# Patient Record
Sex: FEMALE | Race: WHITE | NOT HISPANIC OR LATINO | Employment: FULL TIME | URBAN - METROPOLITAN AREA
[De-identification: names, ages, dates, MRNs, and addresses within clinical notes are randomized per-mention and may not be internally consistent; named-entity substitution may affect disease eponyms.]

---

## 2017-12-22 ENCOUNTER — HOSPITAL ENCOUNTER (EMERGENCY)
Facility: HOSPITAL | Age: 27
Discharge: HOME | End: 2017-12-22
Attending: EMERGENCY MEDICINE | Admitting: EMERGENCY MEDICINE

## 2017-12-22 ENCOUNTER — APPOINTMENT (OUTPATIENT)
Dept: CT IMAGING | Facility: HOSPITAL | Age: 27
End: 2017-12-22

## 2017-12-22 VITALS
HEART RATE: 77 BPM | SYSTOLIC BLOOD PRESSURE: 128 MMHG | OXYGEN SATURATION: 100 % | WEIGHT: 130 LBS | RESPIRATION RATE: 18 BRPM | DIASTOLIC BLOOD PRESSURE: 79 MMHG | HEIGHT: 69 IN | BODY MASS INDEX: 19.26 KG/M2 | TEMPERATURE: 97.9 F

## 2017-12-22 DIAGNOSIS — N20.1 RIGHT URETERAL STONE: ICD-10-CM

## 2017-12-22 DIAGNOSIS — R11.2 NON-INTRACTABLE VOMITING WITH NAUSEA, UNSPECIFIED VOMITING TYPE: ICD-10-CM

## 2017-12-22 DIAGNOSIS — R10.9 RIGHT FLANK PAIN: Primary | ICD-10-CM

## 2017-12-22 LAB
ALBUMIN SERPL-MCNC: 4.2 G/DL (ref 3.2–4.8)
ALBUMIN/GLOB SERPL: 1.3 G/DL (ref 1.5–2.5)
ALP SERPL-CCNC: 52 U/L (ref 25–100)
ALT SERPL W P-5'-P-CCNC: 15 U/L (ref 7–40)
ANION GAP SERPL CALCULATED.3IONS-SCNC: 8 MMOL/L (ref 3–11)
AST SERPL-CCNC: 21 U/L (ref 0–33)
B-HCG UR QL: NEGATIVE
BACTERIA UR QL AUTO: ABNORMAL /HPF
BASOPHILS # BLD AUTO: 0.03 10*3/MM3 (ref 0–0.2)
BASOPHILS NFR BLD AUTO: 0.2 % (ref 0–1)
BILIRUB SERPL-MCNC: 0.4 MG/DL (ref 0.3–1.2)
BILIRUB UR QL STRIP: NEGATIVE
BUN BLD-MCNC: 12 MG/DL (ref 9–23)
BUN/CREAT SERPL: 10.9 (ref 7–25)
CALCIUM SPEC-SCNC: 9.2 MG/DL (ref 8.7–10.4)
CHLORIDE SERPL-SCNC: 109 MMOL/L (ref 99–109)
CLARITY UR: CLEAR
CO2 SERPL-SCNC: 22 MMOL/L (ref 20–31)
COLOR UR: YELLOW
CREAT BLD-MCNC: 1.1 MG/DL (ref 0.6–1.3)
DEPRECATED RDW RBC AUTO: 35.7 FL (ref 37–54)
EOSINOPHIL # BLD AUTO: 0.03 10*3/MM3 (ref 0–0.3)
EOSINOPHIL NFR BLD AUTO: 0.2 % (ref 0–3)
ERYTHROCYTE [DISTWIDTH] IN BLOOD BY AUTOMATED COUNT: 12.5 % (ref 11.3–14.5)
GFR SERPL CREATININE-BSD FRML MDRD: 61 ML/MIN/1.73
GLOBULIN UR ELPH-MCNC: 3.3 GM/DL
GLUCOSE BLD-MCNC: 107 MG/DL (ref 70–100)
GLUCOSE UR STRIP-MCNC: NEGATIVE MG/DL
HCT VFR BLD AUTO: 37.8 % (ref 34.5–44)
HGB BLD-MCNC: 12.6 G/DL (ref 11.5–15.5)
HGB UR QL STRIP.AUTO: NEGATIVE
HYALINE CASTS UR QL AUTO: ABNORMAL /LPF
IMM GRANULOCYTES # BLD: 0.04 10*3/MM3 (ref 0–0.03)
IMM GRANULOCYTES NFR BLD: 0.3 % (ref 0–0.6)
INTERNAL NEGATIVE CONTROL: NEGATIVE
INTERNAL POSITIVE CONTROL: POSITIVE
KETONES UR QL STRIP: ABNORMAL
LEUKOCYTE ESTERASE UR QL STRIP.AUTO: NEGATIVE
LIPASE SERPL-CCNC: 31 U/L (ref 6–51)
LYMPHOCYTES # BLD AUTO: 2 10*3/MM3 (ref 0.6–4.8)
LYMPHOCYTES NFR BLD AUTO: 14.4 % (ref 24–44)
Lab: NORMAL
MCH RBC QN AUTO: 26.1 PG (ref 27–31)
MCHC RBC AUTO-ENTMCNC: 33.3 G/DL (ref 32–36)
MCV RBC AUTO: 78.4 FL (ref 80–99)
MONOCYTES # BLD AUTO: 0.82 10*3/MM3 (ref 0–1)
MONOCYTES NFR BLD AUTO: 5.9 % (ref 0–12)
MUCOUS THREADS URNS QL MICRO: ABNORMAL /HPF
NEUTROPHILS # BLD AUTO: 10.99 10*3/MM3 (ref 1.5–8.3)
NEUTROPHILS NFR BLD AUTO: 79 % (ref 41–71)
NITRITE UR QL STRIP: NEGATIVE
PH UR STRIP.AUTO: 6 [PH] (ref 5–8)
PLATELET # BLD AUTO: 211 10*3/MM3 (ref 150–450)
PMV BLD AUTO: 10.1 FL (ref 6–12)
POTASSIUM BLD-SCNC: 3.9 MMOL/L (ref 3.5–5.5)
PROT SERPL-MCNC: 7.5 G/DL (ref 5.7–8.2)
PROT UR QL STRIP: ABNORMAL
RBC # BLD AUTO: 4.82 10*6/MM3 (ref 3.89–5.14)
RBC # UR: ABNORMAL /HPF
REF LAB TEST METHOD: ABNORMAL
SODIUM BLD-SCNC: 139 MMOL/L (ref 132–146)
SP GR UR STRIP: >=1.03 (ref 1–1.03)
SQUAMOUS #/AREA URNS HPF: ABNORMAL /HPF
UROBILINOGEN UR QL STRIP: ABNORMAL
WBC NRBC COR # BLD: 13.91 10*3/MM3 (ref 3.5–10.8)
WBC UR QL AUTO: ABNORMAL /HPF

## 2017-12-22 PROCEDURE — 25010000002 ONDANSETRON PER 1 MG: Performed by: EMERGENCY MEDICINE

## 2017-12-22 PROCEDURE — 96361 HYDRATE IV INFUSION ADD-ON: CPT

## 2017-12-22 PROCEDURE — 85025 COMPLETE CBC W/AUTO DIFF WBC: CPT | Performed by: PHYSICIAN ASSISTANT

## 2017-12-22 PROCEDURE — 74176 CT ABD & PELVIS W/O CONTRAST: CPT

## 2017-12-22 PROCEDURE — 25010000002 ONDANSETRON PER 1 MG: Performed by: PHYSICIAN ASSISTANT

## 2017-12-22 PROCEDURE — 81001 URINALYSIS AUTO W/SCOPE: CPT | Performed by: PHYSICIAN ASSISTANT

## 2017-12-22 PROCEDURE — 87086 URINE CULTURE/COLONY COUNT: CPT | Performed by: PHYSICIAN ASSISTANT

## 2017-12-22 PROCEDURE — 99283 EMERGENCY DEPT VISIT LOW MDM: CPT

## 2017-12-22 PROCEDURE — 83690 ASSAY OF LIPASE: CPT | Performed by: PHYSICIAN ASSISTANT

## 2017-12-22 PROCEDURE — 25010000002 HYDROMORPHONE PER 4 MG: Performed by: EMERGENCY MEDICINE

## 2017-12-22 PROCEDURE — 96374 THER/PROPH/DIAG INJ IV PUSH: CPT

## 2017-12-22 PROCEDURE — 96375 TX/PRO/DX INJ NEW DRUG ADDON: CPT

## 2017-12-22 PROCEDURE — 80053 COMPREHEN METABOLIC PANEL: CPT | Performed by: PHYSICIAN ASSISTANT

## 2017-12-22 PROCEDURE — 96376 TX/PRO/DX INJ SAME DRUG ADON: CPT

## 2017-12-22 PROCEDURE — 25010000002 KETOROLAC TROMETHAMINE PER 15 MG: Performed by: PHYSICIAN ASSISTANT

## 2017-12-22 RX ORDER — ONDANSETRON 2 MG/ML
4 INJECTION INTRAMUSCULAR; INTRAVENOUS ONCE
Status: COMPLETED | OUTPATIENT
Start: 2017-12-22 | End: 2017-12-22

## 2017-12-22 RX ORDER — HYDROMORPHONE HYDROCHLORIDE 1 MG/ML
0.25 INJECTION, SOLUTION INTRAMUSCULAR; INTRAVENOUS; SUBCUTANEOUS ONCE
Status: COMPLETED | OUTPATIENT
Start: 2017-12-22 | End: 2017-12-22

## 2017-12-22 RX ORDER — TAMSULOSIN HYDROCHLORIDE 0.4 MG/1
1 CAPSULE ORAL DAILY
Qty: 30 CAPSULE | Refills: 0 | Status: SHIPPED | OUTPATIENT
Start: 2017-12-22 | End: 2021-02-22

## 2017-12-22 RX ORDER — ONDANSETRON 4 MG/1
4 TABLET, ORALLY DISINTEGRATING ORAL EVERY 6 HOURS PRN
Qty: 15 TABLET | Refills: 0 | Status: SHIPPED | OUTPATIENT
Start: 2017-12-22 | End: 2021-02-22

## 2017-12-22 RX ORDER — DROSPIRENONE AND ETHINYL ESTRADIOL 0.02-3(28)
1 KIT ORAL DAILY
COMMUNITY
End: 2021-02-22

## 2017-12-22 RX ORDER — CEFDINIR 300 MG/1
300 CAPSULE ORAL 2 TIMES DAILY
Qty: 20 CAPSULE | Refills: 0 | Status: SHIPPED | OUTPATIENT
Start: 2017-12-22 | End: 2018-01-01

## 2017-12-22 RX ORDER — HYDROCODONE BITARTRATE AND ACETAMINOPHEN 7.5; 325 MG/1; MG/1
1 TABLET ORAL EVERY 6 HOURS PRN
Qty: 15 TABLET | Refills: 0 | Status: SHIPPED | OUTPATIENT
Start: 2017-12-22 | End: 2021-02-22

## 2017-12-22 RX ORDER — HYDROMORPHONE HYDROCHLORIDE 1 MG/ML
0.5 INJECTION, SOLUTION INTRAMUSCULAR; INTRAVENOUS; SUBCUTANEOUS ONCE
Status: DISCONTINUED | OUTPATIENT
Start: 2017-12-22 | End: 2017-12-22

## 2017-12-22 RX ORDER — SODIUM CHLORIDE 0.9 % (FLUSH) 0.9 %
10 SYRINGE (ML) INJECTION AS NEEDED
Status: DISCONTINUED | OUTPATIENT
Start: 2017-12-22 | End: 2017-12-22 | Stop reason: HOSPADM

## 2017-12-22 RX ORDER — KETOROLAC TROMETHAMINE 15 MG/ML
15 INJECTION, SOLUTION INTRAMUSCULAR; INTRAVENOUS ONCE
Status: COMPLETED | OUTPATIENT
Start: 2017-12-22 | End: 2017-12-22

## 2017-12-22 RX ADMIN — SODIUM CHLORIDE 1000 ML: 9 INJECTION, SOLUTION INTRAVENOUS at 11:42

## 2017-12-22 RX ADMIN — ONDANSETRON 4 MG: 2 INJECTION INTRAMUSCULAR; INTRAVENOUS at 11:43

## 2017-12-22 RX ADMIN — HYDROMORPHONE HYDROCHLORIDE 0.25 MG: 1 INJECTION, SOLUTION INTRAMUSCULAR; INTRAVENOUS; SUBCUTANEOUS at 14:04

## 2017-12-22 RX ADMIN — KETOROLAC TROMETHAMINE 15 MG: 15 INJECTION, SOLUTION INTRAMUSCULAR; INTRAVENOUS at 11:44

## 2017-12-22 RX ADMIN — HYDROMORPHONE HYDROCHLORIDE 0.25 MG: 1 INJECTION, SOLUTION INTRAMUSCULAR; INTRAVENOUS; SUBCUTANEOUS at 12:53

## 2017-12-22 RX ADMIN — ONDANSETRON 4 MG: 2 INJECTION INTRAMUSCULAR; INTRAVENOUS at 14:01

## 2017-12-22 NOTE — ED PROVIDER NOTES
Subjective   HPI Comments: Patient is a 28 yo female presenting to ED with right flank pain. Patient describes the pain as sharp and stabbing that began around 0830 this morning. The pain has been constant but with episodes of worse pain that caused her to vomit. She denies urinary sx. She denies fevers. She has prior hx of kidney stones. No prior abdominal surgery.     Patient is a 27 y.o. female presenting with flank pain.   History provided by:  Patient  Flank Pain   Pain location:  R flank  Pain quality: sharp    Pain radiates to:  RLQ  Onset quality:  Sudden  Duration:  4 hours  Timing:  Constant  Progression:  Waxing and waning  Relieved by: Partially improved with Motrin, Lortab.  Associated symptoms: chills, nausea and vomiting    Associated symptoms: no chest pain, no constipation, no cough, no diarrhea, no dysuria, no fever, no hematuria, no shortness of breath, no sore throat, no vaginal bleeding and no vaginal discharge        Review of Systems   Constitutional: Positive for chills. Negative for fever.   HENT: Negative for congestion, ear pain, sore throat and trouble swallowing.    Eyes: Negative for pain, redness and visual disturbance.   Respiratory: Negative for cough, chest tightness and shortness of breath.    Cardiovascular: Negative for chest pain and leg swelling.   Gastrointestinal: Positive for nausea and vomiting. Negative for abdominal pain, constipation and diarrhea.   Genitourinary: Positive for flank pain. Negative for difficulty urinating, dysuria, hematuria, vaginal bleeding and vaginal discharge.   Musculoskeletal: Negative for arthralgias, back pain and joint swelling.   Skin: Negative for rash and wound.   Neurological: Negative for dizziness, syncope, speech difficulty, weakness, numbness and headaches.   Psychiatric/Behavioral: Negative for confusion.   All other systems reviewed and are negative.      History reviewed. No pertinent past medical history.    Allergies   Allergen  Reactions   • Amoxicillin    • Lidocaine        Past Surgical History:   Procedure Laterality Date   • TOOTH EXTRACTION         History reviewed. No pertinent family history.    Social History     Social History   • Marital status: Single     Spouse name: N/A   • Number of children: N/A   • Years of education: N/A     Social History Main Topics   • Smoking status: Never Smoker   • Smokeless tobacco: None   • Alcohol use Yes      Comment: OCCASIONAL   • Drug use: No   • Sexual activity: Not Asked     Other Topics Concern   • None     Social History Narrative   • None           Objective   Physical Exam   Constitutional: She is oriented to person, place, and time. Vital signs are normal. She appears well-developed.   HENT:   Head: Atraumatic.   Nose: Nose normal.   Mouth/Throat: Mucous membranes are normal.   Eyes: Conjunctivae, EOM and lids are normal. Pupils are equal, round, and reactive to light.   Neck: Normal range of motion. Neck supple.   Cardiovascular: Normal rate, regular rhythm and normal heart sounds.    Pulmonary/Chest: Effort normal and breath sounds normal. She has no wheezes.   Abdominal: Soft. She exhibits no distension. There is no tenderness. There is CVA tenderness (Right). There is no rebound and no guarding.   Musculoskeletal: Normal range of motion. She exhibits no edema or tenderness.   Neurological: She is alert and oriented to person, place, and time. No sensory deficit.   Skin: Skin is warm and dry. No rash noted. No erythema.   Psychiatric: She has a normal mood and affect. Her speech is normal and behavior is normal.   Nursing note and vitals reviewed.      Procedures         ED Course  ED Course      Re-examined patient several times in ED. Pt feeling better after treatment. Discussed results and treatment plan. Will send home with Lortab, Zofran, Flomax, and Omncief.     JESSICA query complete. Treatment plan to include limited course of prescribed  controlled substance. Risks including  addiction, benefits, and alternatives presented to patient.       Recent Results (from the past 24 hour(s))   Comprehensive Metabolic Panel    Collection Time: 12/22/17 11:24 AM   Result Value Ref Range    Glucose 107 (H) 70 - 100 mg/dL    BUN 12 9 - 23 mg/dL    Creatinine 1.10 0.60 - 1.30 mg/dL    Sodium 139 132 - 146 mmol/L    Potassium 3.9 3.5 - 5.5 mmol/L    Chloride 109 99 - 109 mmol/L    CO2 22.0 20.0 - 31.0 mmol/L    Calcium 9.2 8.7 - 10.4 mg/dL    Total Protein 7.5 5.7 - 8.2 g/dL    Albumin 4.20 3.20 - 4.80 g/dL    ALT (SGPT) 15 7 - 40 U/L    AST (SGOT) 21 0 - 33 U/L    Alkaline Phosphatase 52 25 - 100 U/L    Total Bilirubin 0.4 0.3 - 1.2 mg/dL    eGFR Non African Amer 61 >60 mL/min/1.73    Globulin 3.3 gm/dL    A/G Ratio 1.3 (L) 1.5 - 2.5 g/dL    BUN/Creatinine Ratio 10.9 7.0 - 25.0    Anion Gap 8.0 3.0 - 11.0 mmol/L   Lipase    Collection Time: 12/22/17 11:24 AM   Result Value Ref Range    Lipase 31 6 - 51 U/L   Urinalysis With / Culture If Indicated - Urine, Clean Catch    Collection Time: 12/22/17 11:24 AM   Result Value Ref Range    Color, UA Yellow Yellow, Straw    Appearance, UA Clear Clear    pH, UA 6.0 5.0 - 8.0    Specific Gravity, UA >=1.030 1.001 - 1.030    Glucose, UA Negative Negative    Ketones, UA Trace (A) Negative    Bilirubin, UA Negative Negative    Blood, UA Negative Negative    Protein, UA 30 mg/dL (1+) (A) Negative    Leuk Esterase, UA Negative Negative    Nitrite, UA Negative Negative    Urobilinogen, UA 0.2 E.U./dL 0.2 - 1.0 E.U./dL   CBC Auto Differential    Collection Time: 12/22/17 11:24 AM   Result Value Ref Range    WBC 13.91 (H) 3.50 - 10.80 10*3/mm3    RBC 4.82 3.89 - 5.14 10*6/mm3    Hemoglobin 12.6 11.5 - 15.5 g/dL    Hematocrit 37.8 34.5 - 44.0 %    MCV 78.4 (L) 80.0 - 99.0 fL    MCH 26.1 (L) 27.0 - 31.0 pg    MCHC 33.3 32.0 - 36.0 g/dL    RDW 12.5 11.3 - 14.5 %    RDW-SD 35.7 (L) 37.0 - 54.0 fl    MPV 10.1 6.0 - 12.0 fL    Platelets 211 150 - 450 10*3/mm3    Neutrophil %  79.0 (H) 41.0 - 71.0 %    Lymphocyte % 14.4 (L) 24.0 - 44.0 %    Monocyte % 5.9 0.0 - 12.0 %    Eosinophil % 0.2 0.0 - 3.0 %    Basophil % 0.2 0.0 - 1.0 %    Immature Grans % 0.3 0.0 - 0.6 %    Neutrophils, Absolute 10.99 (H) 1.50 - 8.30 10*3/mm3    Lymphocytes, Absolute 2.00 0.60 - 4.80 10*3/mm3    Monocytes, Absolute 0.82 0.00 - 1.00 10*3/mm3    Eosinophils, Absolute 0.03 0.00 - 0.30 10*3/mm3    Basophils, Absolute 0.03 0.00 - 0.20 10*3/mm3    Immature Grans, Absolute 0.04 (H) 0.00 - 0.03 10*3/mm3   Urinalysis, Microscopic Only - Urine, Clean Catch    Collection Time: 12/22/17 11:24 AM   Result Value Ref Range    RBC, UA 0-2 None Seen, 0-2 /HPF    WBC, UA 6-12 (A) None Seen /HPF    Bacteria, UA 1+ (A) None Seen, Trace /HPF    Squamous Epithelial Cells, UA 7-12 (A) None Seen, 0-2 /HPF    Hyaline Casts, UA 7-12 0 - 6 /LPF    Mucus, UA Large/3+ (A) None Seen, Trace /HPF    Methodology Manual Light Microscopy    POCT Pregnancy, Urine    Collection Time: 12/22/17 11:36 AM   Result Value Ref Range    HCG, Urine, QL Negative Negative    Lot Number [id number]     Internal Positive Control Positive     Internal Negative Control Negative      Note: In addition to lab results from this visit, the labs listed above may include labs taken at another facility or during a different encounter within the last 24 hours. Please correlate lab times with ED admission and discharge times for further clarification of the services performed during this visit.    CT Abdomen Pelvis Without Contrast   Preliminary Result   1. A 1 mm calculus in the pelvis, near the expected location of the   right ureter. Mild proximal ureteral dilatation but no evidence of   distal ureteral or renal collecting system dilatation. This is equivocal   for a minimally or nonobstructing stone. Please correlate with patient's   clinical findings.   2. No evidence of acute disease elsewhere.       D:  12/22/2017   E:  12/22/2017            Vitals:    12/22/17 1119  "12/22/17 1120 12/22/17 1121 12/22/17 1130   BP:  137/87 137/87 128/79   BP Location:   Right arm    Patient Position:   Lying    Pulse:   76 77   Resp:   18 18   Temp:   97.9 °F (36.6 °C)    TempSrc:   Oral    SpO2:   100% 100%   Weight: 59 kg (130 lb)      Height: 175.3 cm (69\")        Medications   ketorolac (TORADOL) injection 15 mg (15 mg Intravenous Given 12/22/17 1144)   ondansetron (ZOFRAN) injection 4 mg (4 mg Intravenous Given 12/22/17 1143)   sodium chloride 0.9 % bolus 1,000 mL (0 mL Intravenous Stopped 12/22/17 1242)   HYDROmorphone (DILAUDID) injection 0.25 mg (0.25 mg Intravenous Given 12/22/17 1253)   HYDROmorphone (DILAUDID) injection 0.25 mg (0.25 mg Intravenous Given 12/22/17 1404)   ondansetron (ZOFRAN) injection 4 mg (4 mg Intravenous Given 12/22/17 1401)                           MDM    Final diagnoses:   Right flank pain   Right ureteral stone   Non-intractable vomiting with nausea, unspecified vomiting type            BRYANT Obrien  12/22/17 2012       BRYANT Obrien  12/22/17 2013    "

## 2017-12-24 LAB
BACTERIA SPEC AEROBE CULT: NORMAL
BACTERIA SPEC AEROBE CULT: NORMAL

## 2018-01-07 ENCOUNTER — HOSPITAL ENCOUNTER (EMERGENCY)
Facility: HOSPITAL | Age: 28
Discharge: HOME | End: 2018-01-07
Attending: EMERGENCY MEDICINE | Admitting: EMERGENCY MEDICINE

## 2018-01-07 VITALS
RESPIRATION RATE: 16 BRPM | WEIGHT: 130 LBS | TEMPERATURE: 98.2 F | OXYGEN SATURATION: 100 % | BODY MASS INDEX: 19.26 KG/M2 | HEART RATE: 89 BPM | HEIGHT: 69 IN | DIASTOLIC BLOOD PRESSURE: 80 MMHG | SYSTOLIC BLOOD PRESSURE: 126 MMHG

## 2018-01-07 DIAGNOSIS — A04.72 CLOSTRIDIUM DIFFICILE COLITIS: Primary | ICD-10-CM

## 2018-01-07 LAB
ALBUMIN SERPL-MCNC: 4.2 G/DL (ref 3.2–4.8)
ALBUMIN/GLOB SERPL: 1.3 G/DL (ref 1.5–2.5)
ALP SERPL-CCNC: 72 U/L (ref 25–100)
ALT SERPL W P-5'-P-CCNC: 13 U/L (ref 7–40)
ANION GAP SERPL CALCULATED.3IONS-SCNC: 12 MMOL/L (ref 3–11)
AST SERPL-CCNC: 18 U/L (ref 0–33)
B-HCG UR QL: NEGATIVE
BACTERIA UR QL AUTO: ABNORMAL /HPF
BASOPHILS # BLD AUTO: 0.03 10*3/MM3 (ref 0–0.2)
BASOPHILS NFR BLD AUTO: 0.2 % (ref 0–1)
BILIRUB SERPL-MCNC: 0.6 MG/DL (ref 0.3–1.2)
BILIRUB UR QL STRIP: ABNORMAL
BUN BLD-MCNC: 8 MG/DL (ref 9–23)
BUN/CREAT SERPL: 8.9 (ref 7–25)
C DIFF TOX GENS STL QL NAA+PROBE: DETECTED
CALCIUM SPEC-SCNC: 8.9 MG/DL (ref 8.7–10.4)
CHLORIDE SERPL-SCNC: 100 MMOL/L (ref 99–109)
CLARITY UR: ABNORMAL
CO2 SERPL-SCNC: 21 MMOL/L (ref 20–31)
COLOR UR: ABNORMAL
CREAT BLD-MCNC: 0.9 MG/DL (ref 0.6–1.3)
DEPRECATED RDW RBC AUTO: 33.8 FL (ref 37–54)
EOSINOPHIL # BLD AUTO: 0.05 10*3/MM3 (ref 0–0.3)
EOSINOPHIL NFR BLD AUTO: 0.4 % (ref 0–3)
ERYTHROCYTE [DISTWIDTH] IN BLOOD BY AUTOMATED COUNT: 12.3 % (ref 11.3–14.5)
GFR SERPL CREATININE-BSD FRML MDRD: 76 ML/MIN/1.73
GLOBULIN UR ELPH-MCNC: 3.2 GM/DL
GLUCOSE BLD-MCNC: 74 MG/DL (ref 70–100)
GLUCOSE UR STRIP-MCNC: NEGATIVE MG/DL
HCT VFR BLD AUTO: 40.2 % (ref 34.5–44)
HGB BLD-MCNC: 13.8 G/DL (ref 11.5–15.5)
HGB UR QL STRIP.AUTO: NEGATIVE
HOLD SPECIMEN: NORMAL
HOLD SPECIMEN: NORMAL
HYALINE CASTS UR QL AUTO: ABNORMAL /LPF
IMM GRANULOCYTES # BLD: 0.03 10*3/MM3 (ref 0–0.03)
IMM GRANULOCYTES NFR BLD: 0.2 % (ref 0–0.6)
INTERNAL NEGATIVE CONTROL: NORMAL
INTERNAL POSITIVE CONTROL: NORMAL
KETONES UR QL STRIP: ABNORMAL
LEUKOCYTE ESTERASE UR QL STRIP.AUTO: NEGATIVE
LIPASE SERPL-CCNC: 28 U/L (ref 6–51)
LYMPHOCYTES # BLD AUTO: 1.54 10*3/MM3 (ref 0.6–4.8)
LYMPHOCYTES NFR BLD AUTO: 11.3 % (ref 24–44)
Lab: NORMAL
MCH RBC QN AUTO: 26.3 PG (ref 27–31)
MCHC RBC AUTO-ENTMCNC: 34.3 G/DL (ref 32–36)
MCV RBC AUTO: 76.7 FL (ref 80–99)
MONOCYTES # BLD AUTO: 1.03 10*3/MM3 (ref 0–1)
MONOCYTES NFR BLD AUTO: 7.6 % (ref 0–12)
MUCOUS THREADS URNS QL MICRO: ABNORMAL /HPF
NEUTROPHILS # BLD AUTO: 10.9 10*3/MM3 (ref 1.5–8.3)
NEUTROPHILS NFR BLD AUTO: 80.3 % (ref 41–71)
NITRITE UR QL STRIP: NEGATIVE
PH UR STRIP.AUTO: 5.5 [PH] (ref 5–8)
PLATELET # BLD AUTO: 218 10*3/MM3 (ref 150–450)
PMV BLD AUTO: 10 FL (ref 6–12)
POTASSIUM BLD-SCNC: 3.4 MMOL/L (ref 3.5–5.5)
PROT SERPL-MCNC: 7.4 G/DL (ref 5.7–8.2)
PROT UR QL STRIP: NEGATIVE
RBC # BLD AUTO: 5.24 10*6/MM3 (ref 3.89–5.14)
RBC # UR: ABNORMAL /HPF
REF LAB TEST METHOD: ABNORMAL
SODIUM BLD-SCNC: 133 MMOL/L (ref 132–146)
SP GR UR STRIP: 1.02 (ref 1–1.03)
SQUAMOUS #/AREA URNS HPF: ABNORMAL /HPF
UROBILINOGEN UR QL STRIP: ABNORMAL
WBC NRBC COR # BLD: 13.58 10*3/MM3 (ref 3.5–10.8)
WBC UR QL AUTO: ABNORMAL /HPF
WHOLE BLOOD HOLD SPECIMEN: NORMAL
WHOLE BLOOD HOLD SPECIMEN: NORMAL

## 2018-01-07 PROCEDURE — 85025 COMPLETE CBC W/AUTO DIFF WBC: CPT | Performed by: EMERGENCY MEDICINE

## 2018-01-07 PROCEDURE — 96375 TX/PRO/DX INJ NEW DRUG ADDON: CPT

## 2018-01-07 PROCEDURE — 87046 STOOL CULTR AEROBIC BACT EA: CPT | Performed by: PHYSICIAN ASSISTANT

## 2018-01-07 PROCEDURE — 80053 COMPREHEN METABOLIC PANEL: CPT | Performed by: EMERGENCY MEDICINE

## 2018-01-07 PROCEDURE — 83690 ASSAY OF LIPASE: CPT | Performed by: EMERGENCY MEDICINE

## 2018-01-07 PROCEDURE — 96374 THER/PROPH/DIAG INJ IV PUSH: CPT

## 2018-01-07 PROCEDURE — 25010000002 PROMETHAZINE PER 50 MG: Performed by: PHYSICIAN ASSISTANT

## 2018-01-07 PROCEDURE — 99284 EMERGENCY DEPT VISIT MOD MDM: CPT

## 2018-01-07 PROCEDURE — 81001 URINALYSIS AUTO W/SCOPE: CPT | Performed by: EMERGENCY MEDICINE

## 2018-01-07 PROCEDURE — 87493 C DIFF AMPLIFIED PROBE: CPT | Performed by: PHYSICIAN ASSISTANT

## 2018-01-07 PROCEDURE — 87086 URINE CULTURE/COLONY COUNT: CPT | Performed by: EMERGENCY MEDICINE

## 2018-01-07 PROCEDURE — 87045 FECES CULTURE AEROBIC BACT: CPT | Performed by: PHYSICIAN ASSISTANT

## 2018-01-07 RX ORDER — SODIUM CHLORIDE 0.9 % (FLUSH) 0.9 %
10 SYRINGE (ML) INJECTION AS NEEDED
Status: DISCONTINUED | OUTPATIENT
Start: 2018-01-07 | End: 2018-01-07 | Stop reason: HOSPADM

## 2018-01-07 RX ORDER — CHOLESTYRAMINE LIGHT 4 G/5.7G
4 POWDER, FOR SUSPENSION ORAL 3 TIMES DAILY
Qty: 42 PACKET | Refills: 0 | Status: SHIPPED | OUTPATIENT
Start: 2018-01-07 | End: 2021-02-22

## 2018-01-07 RX ORDER — FAMOTIDINE 10 MG/ML
20 INJECTION, SOLUTION INTRAVENOUS ONCE
Status: COMPLETED | OUTPATIENT
Start: 2018-01-07 | End: 2018-01-07

## 2018-01-07 RX ORDER — PROMETHAZINE HYDROCHLORIDE 25 MG/ML
12.5 INJECTION, SOLUTION INTRAMUSCULAR; INTRAVENOUS ONCE
Status: COMPLETED | OUTPATIENT
Start: 2018-01-07 | End: 2018-01-07

## 2018-01-07 RX ADMIN — FAMOTIDINE 20 MG: 10 INJECTION INTRAVENOUS at 10:40

## 2018-01-07 RX ADMIN — VANCOMYCIN 125 MG: KIT at 15:43

## 2018-01-07 RX ADMIN — SODIUM CHLORIDE 1000 ML: 9 INJECTION, SOLUTION INTRAVENOUS at 10:35

## 2018-01-07 RX ADMIN — PROMETHAZINE HYDROCHLORIDE 12.5 MG: 25 INJECTION INTRAMUSCULAR; INTRAVENOUS at 10:38

## 2018-01-07 NOTE — ED PROVIDER NOTES
Subjective   HPI Comments: 27-year-old female presents to the emergency department with complaints of diarrhea as well as some nausea and vomiting.  The patient states that she started with diarrhea about 4 days ago.  She took some over-the-counter Imodium with minimal relief.  Her symptoms got worse yesterday and today.  She has had several episodes of vomiting.  She has had some crampy abdominal pain associated with the diarrhea.  She notes a small amount of bright red blood secondary to hemorrhoids that have flared up as result of the diarrhea.  The patient thinks that she may have eaten some bad chicken while she was in [place] last week.  The patient notes that she was on antibiotics about 2-1/2 weeks ago for an infected kidney stone.  She finished the antibiotics last week.  No known health issues.  No prior surgeries.  Her PCP is Dr. Juarez.  She is a nonsmoker.  No alcohol or drug use.  Her last menstrual period was one week ago.      History provided by:  Patient      Review of Systems   Constitutional: Positive for appetite change (decreased) and fatigue. Negative for chills.   HENT: Negative for congestion, ear pain, nosebleeds, rhinorrhea and sore throat.    Eyes: Negative for pain, discharge and visual disturbance.   Respiratory: Negative for cough, shortness of breath and wheezing.    Cardiovascular: Negative for chest pain, palpitations and leg swelling.   Gastrointestinal: Positive for abdominal pain, blood in stool, diarrhea, nausea and vomiting.   Endocrine: Negative.    Genitourinary: Negative for dysuria, hematuria and urgency.   Musculoskeletal: Negative for arthralgias and back pain.   Skin: Negative for pallor and rash.   Allergic/Immunologic: Negative for immunocompromised state.   Neurological: Positive for weakness (generalized). Negative for dizziness, speech difficulty and headaches.   Hematological: Negative for adenopathy. Does not bruise/bleed easily.   Psychiatric/Behavioral:  Negative.        History reviewed. No pertinent past medical history.    Allergies   Allergen Reactions   • Amoxicillin    • Lidocaine        Past Surgical History:   Procedure Laterality Date   • TOOTH EXTRACTION         History reviewed. No pertinent family history.    Social History     Social History   • Marital status: Single     Spouse name: N/A   • Number of children: N/A   • Years of education: N/A     Social History Main Topics   • Smoking status: Never Smoker   • Smokeless tobacco: None   • Alcohol use Yes      Comment: OCCASIONAL   • Drug use: No   • Sexual activity: Not Asked     Other Topics Concern   • None     Social History Narrative           Objective   Physical Exam   Constitutional: She is oriented to person, place, and time. She appears well-developed and well-nourished. No distress.   HENT:   Head: Normocephalic and atraumatic.   Nose: Nose normal.   Mouth/Throat: Oropharynx is clear and moist.   Eyes: Conjunctivae and EOM are normal. Pupils are equal, round, and reactive to light. No scleral icterus.   Neck: Normal range of motion. Neck supple.   Cardiovascular: Normal rate, regular rhythm, normal heart sounds and intact distal pulses.    No murmur heard.  Pulmonary/Chest: Effort normal and breath sounds normal. No respiratory distress. She has no wheezes. She has no rales. She exhibits no tenderness.   Abdominal: Soft. Bowel sounds are normal. There is tenderness (mild diffuse tenderness). There is no rebound and no guarding.   Musculoskeletal: Normal range of motion. She exhibits no edema or tenderness.   Neurological: She is alert and oriented to person, place, and time.   Skin: Skin is warm and dry. No rash noted. She is not diaphoretic.   Psychiatric: She has a normal mood and affect.   Nursing note and vitals reviewed.      Procedures         ED Course  ED Course    Pt's stool is positive for c. Diff toxin.  I spoke with Dr. Morrow about follow up.  He recommended oral vancomycin  solution, 125mg qid for 14 days and agreed to follow up with her if office.    Rx called in to [facility].  Recent Results (from the past 24 hour(s))   Comprehensive Metabolic Panel    Collection Time: 01/07/18  9:43 AM   Result Value Ref Range    Glucose 74 70 - 100 mg/dL    BUN 8 (L) 9 - 23 mg/dL    Creatinine 0.90 0.60 - 1.30 mg/dL    Sodium 133 132 - 146 mmol/L    Potassium 3.4 (L) 3.5 - 5.5 mmol/L    Chloride 100 99 - 109 mmol/L    CO2 21.0 20.0 - 31.0 mmol/L    Calcium 8.9 8.7 - 10.4 mg/dL    Total Protein 7.4 5.7 - 8.2 g/dL    Albumin 4.20 3.20 - 4.80 g/dL    ALT (SGPT) 13 7 - 40 U/L    AST (SGOT) 18 0 - 33 U/L    Alkaline Phosphatase 72 25 - 100 U/L    Total Bilirubin 0.6 0.3 - 1.2 mg/dL    eGFR Non African Amer 76 >60 mL/min/1.73    Globulin 3.2 gm/dL    A/G Ratio 1.3 (L) 1.5 - 2.5 g/dL    BUN/Creatinine Ratio 8.9 7.0 - 25.0    Anion Gap 12.0 (H) 3.0 - 11.0 mmol/L   Lipase    Collection Time: 01/07/18  9:43 AM   Result Value Ref Range    Lipase 28 6 - 51 U/L   Light Blue Top    Collection Time: 01/07/18  9:43 AM   Result Value Ref Range    Extra Tube hold for add-on    Green Top (Gel)    Collection Time: 01/07/18  9:43 AM   Result Value Ref Range    Extra Tube Hold for add-ons.    Lavender Top    Collection Time: 01/07/18  9:43 AM   Result Value Ref Range    Extra Tube hold for add-on    Gold Top - SST    Collection Time: 01/07/18  9:43 AM   Result Value Ref Range    Extra Tube Hold for add-ons.    CBC Auto Differential    Collection Time: 01/07/18  9:43 AM   Result Value Ref Range    WBC 13.58 (H) 3.50 - 10.80 10*3/mm3    RBC 5.24 (H) 3.89 - 5.14 10*6/mm3    Hemoglobin 13.8 11.5 - 15.5 g/dL    Hematocrit 40.2 34.5 - 44.0 %    MCV 76.7 (L) 80.0 - 99.0 fL    MCH 26.3 (L) 27.0 - 31.0 pg    MCHC 34.3 32.0 - 36.0 g/dL    RDW 12.3 11.3 - 14.5 %    RDW-SD 33.8 (L) 37.0 - 54.0 fl    MPV 10.0 6.0 - 12.0 fL    Platelets 218 150 - 450 10*3/mm3    Neutrophil % 80.3 (H) 41.0 - 71.0 %    Lymphocyte %  11.3 (L) 24.0 - 44.0 %    Monocyte % 7.6 0.0 - 12.0 %    Eosinophil % 0.4 0.0 - 3.0 %    Basophil % 0.2 0.0 - 1.0 %    Immature Grans % 0.2 0.0 - 0.6 %    Neutrophils, Absolute 10.90 (H) 1.50 - 8.30 10*3/mm3    Lymphocytes, Absolute 1.54 0.60 - 4.80 10*3/mm3    Monocytes, Absolute 1.03 (H) 0.00 - 1.00 10*3/mm3    Eosinophils, Absolute 0.05 0.00 - 0.30 10*3/mm3    Basophils, Absolute 0.03 0.00 - 0.20 10*3/mm3    Immature Grans, Absolute 0.03 0.00 - 0.03 10*3/mm3   Urinalysis With / Culture If Indicated - Urine, Clean Catch    Collection Time: 01/07/18  9:44 AM   Result Value Ref Range    Color, UA Dark Yellow (A) Yellow, Straw    Appearance, UA Cloudy (A) Clear    pH, UA 5.5 5.0 - 8.0    Specific Gravity, UA 1.020 1.001 - 1.030    Glucose, UA Negative Negative    Ketones, UA 80 mg/dL (3+) (A) Negative    Bilirubin, UA Small (1+) (A) Negative    Blood, UA Negative Negative    Protein, UA Negative Negative    Leuk Esterase, UA Negative Negative    Nitrite, UA Negative Negative    Urobilinogen, UA 0.2 E.U./dL 0.2 - 1.0 E.U./dL   Urinalysis, Microscopic Only - Urine, Clean Catch    Collection Time: 01/07/18  9:44 AM   Result Value Ref Range    RBC, UA 0-2 None Seen, 0-2 /HPF    WBC, UA 6-12 (A) None Seen /HPF    Bacteria, UA Trace None Seen, Trace /HPF    Squamous Epithelial Cells, UA 7-12 (A) None Seen, 0-2 /HPF    Hyaline Casts, UA None Seen 0 - 6 /LPF    Mucus, UA Large/3+ (A) None Seen, Trace /HPF    Methodology Manual Light Microscopy    POCT pregnancy, urine    Collection Time: 01/07/18  9:49 AM   Result Value Ref Range    HCG, Urine, QL Negative Negative    Lot Number [id number]     Internal Positive Control Presumptive Positive     Internal Negative Control Presumptive Negative    Clostridium Difficile Toxin, PCR - Stool, Per Rectum    Collection Time: 01/07/18 12:48 PM   Result Value Ref Range    C. Difficile Toxins by PCR Detected (A) Negative     Note: In addition to lab results from this visit, the labs  listed above may include labs taken at another facility or during a different encounter within the last 24 hours. Please correlate lab times with ED admission and discharge times for further clarification of the services performed during this visit.    No orders to display     Vitals:    01/07/18 1100 01/07/18 1200 01/07/18 1300 01/07/18 1400   BP: 120/81 131/83 121/76 118/68   BP Location:       Patient Position:       Pulse: 85 88 82 80   Resp: 16 16 16    Temp:       TempSrc:       SpO2: 99% 100% 100% 98%   Weight:       Height:         Medications   sodium chloride 0.9 % flush 10 mL (not administered)   vancomycin oral solution 125 mg (not administered)   sodium chloride 0.9 % bolus 2,000 mL (0 mL Intravenous Stopped 1/7/18 1233)   promethazine (PHENERGAN) injection 12.5 mg (12.5 mg Intravenous Given 1/7/18 1038)   famotidine (PEPCID) injection 20 mg (20 mg Intravenous Given 1/7/18 1040)     ECG/EMG Results (last 24 hours)     ** No results found for the last 24 hours. **                    [facility]    Final diagnoses:   Clostridium difficile colitis            BRYANT Rosas  01/07/18 7953

## 2018-01-09 LAB
BACTERIA SPEC AEROBE CULT: NORMAL
BACTERIA SPEC AEROBE CULT: NORMAL
BACTERIA STL CULT: NORMAL

## 2020-10-26 ENCOUNTER — TELEPHONE (OUTPATIENT)
Dept: OBSTETRICS AND GYNECOLOGY | Facility: CLINIC | Age: 30
End: 2020-10-26

## 2020-10-26 NOTE — TELEPHONE ENCOUNTER
Patient is newly pregnant and experiencing nausea. She would like to know what she can do to alliviate this?

## 2020-10-26 NOTE — TELEPHONE ENCOUNTER
Suggested small frequent meals.  She can try Unisom and Vitamin B 6.  It is ok to take tums and/or maalox

## 2020-11-02 ENCOUNTER — INITIAL PRENATAL (OUTPATIENT)
Dept: OBSTETRICS AND GYNECOLOGY | Facility: CLINIC | Age: 30
End: 2020-11-02

## 2020-11-02 VITALS — BODY MASS INDEX: 19.05 KG/M2 | DIASTOLIC BLOOD PRESSURE: 60 MMHG | WEIGHT: 129 LBS | SYSTOLIC BLOOD PRESSURE: 118 MMHG

## 2020-11-02 DIAGNOSIS — Z3A.01 LESS THAN 8 WEEKS GESTATION OF PREGNANCY: Primary | ICD-10-CM

## 2020-11-02 DIAGNOSIS — K64.4 EXTERNAL HEMORRHOIDS: ICD-10-CM

## 2020-11-02 PROBLEM — Z34.90 PREGNANCY: Status: ACTIVE | Noted: 2020-11-02

## 2020-11-02 PROCEDURE — 0501F PRENATAL FLOW SHEET: CPT | Performed by: OBSTETRICS & GYNECOLOGY

## 2020-11-02 NOTE — PROGRESS NOTES
Initial ob visit     CC- Here for care of pregnancy        Alyse Bojorquez is a 30 y.o. female, , who presents for her first obstetrical visit.  Her last LMP was Patient's last menstrual period was 2020..    OB History    Para Term  AB Living   1             SAB TAB Ectopic Molar Multiple Live Births                    # Outcome Date GA Lbr Jose/2nd Weight Sex Delivery Anes PTL Lv   1 Current                Initial positive test date : 10/6/2020, UPT          Prior obstetric issues, potential pregnancy concerns: first pregnancy  Family history of genetic issues (includes FOB): none  Prior infections concerning in pregnancy (Rash, fever in last 2 weeks): none  Varicella Hx - vaccine  Prior testing for Cystic Fibrosis Carrier or Sickle Cell Trait-  never  Prepregnancy BMI - Body mass index is 19.05 kg/m².  History of STD: no    Additional Pertinent History   Last Pap :   Last Completed Pap Smear       Status Date      PAP SMEAR Done 2020 normal in kg with [facility]        History of abnormal Pap smear: no  Family history of uterine, colon, breast, or ovarian cancer: no    Feelings of Anxiety or Depression: no  Tobacco Usage?: No   Alcohol/Drug Use?: NO  Over the age of 35 at delivery: no  Desires Genetic Screening: unsure  Flu Status: Already given in current flu season    PMH  Past Medical History:   Diagnosis Date   • Anal fissure    • Internal hemorrhoid    • Papanicolaou smear 2018       Current Outpatient Medications:   •  cholestyramine light (PREVALITE) 4 g packet, Take 1 packet by mouth 3 (Three) Times a Day., Disp: 42 packet, Rfl: 0      The additional following portions of the patient's history were reviewed and updated as appropriate: allergies, current medications, past family history, past medical history, past social history, past surgical history and problem list.    Review of Systems   Review of Systems  Current obstetric complaints : Nausea and Vomiting, vomiting a  couple of times. Vaginal spotting only once after IC  All systems reviewed and otherwise normal.    I have reviewed and agree with the HPI, ROS, and historical information as entered above. Kavitha Paniagua MD    /60   Wt 58.5 kg (129 lb)   LMP 09/05/2020   BMI 19.05 kg/m²     Physical Exam  General:  well developed; well nourished  no acute distress   Chest/Respiratory: No labored breathing, normal respiratory effort, normal appearance, no respiratory noises noted   Heart:  normal rate, regular rhythm,  no murmurs, rubs, or gallops   Thyroid: normal to inspection and palpation   Breasts:  Not performed.   Abdomen: soft, non-tender; no masses  no umbilical or inguinal hernias are present  no hepato-splenomegaly   Pelvis: Not performed.        Assessment and Plan    Problem List Items Addressed This Visit        Cardiovascular and Mediastinum    External hemorrhoids    Overview     Followed by Dr. Bowden.             Other    Pregnancy - Primary    Relevant Orders    Obstetric Panel    Hepatitis C Antibody    HIV-1 / O / 2 Ag / Antibody 4th Generation    Urine Culture - Urine, Urine, Clean Catch    Chlamydia trachomatis, Neisseria gonorrhoeae, PCR - Urine, Urine, Clean Catch          1. Pregnancy at Unknown  2. Reviewed routine prenatal care with the office and educational materials given  3. Lab(s) Ordered  4. Discussed options for genetic testing including first trimester nuchal translucency screen, genetic disease carrier testing, quadruple screen, and [place].  5. Discontinue the use of all non-medicinal drugs and chemicals  6. Nausea/Vomiting - she does not desire medications at this time.  Discussed conservative ways to help with nausea.  7. Patient is on Prenatal vitamins  8. Activity recommendation : 150 minutes/week of moderate intensity aerobic activity unless we limit for bleeding, hypertension or other pregnancy complication   Return in about 4 weeks (around 11/30/2020).      Kavitha Deshpande  MD [facility]  11/02/2020

## 2020-11-05 LAB
ABO GROUP BLD: NORMAL
BACTERIA UR CULT: NO GROWTH
BACTERIA UR CULT: NORMAL
BASOPHILS # BLD AUTO: 0 X10E3/UL (ref 0–0.2)
BASOPHILS NFR BLD AUTO: 1 %
BLD GP AB SCN SERPL QL: NEGATIVE
C TRACH RRNA SPEC QL NAA+PROBE: NEGATIVE
EOSINOPHIL # BLD AUTO: 0 X10E3/UL (ref 0–0.4)
EOSINOPHIL NFR BLD AUTO: 0 %
ERYTHROCYTE [DISTWIDTH] IN BLOOD BY AUTOMATED COUNT: 13.6 % (ref 11.7–15.4)
HBV SURFACE AG SERPL QL IA: NEGATIVE
HCT VFR BLD AUTO: 39.5 % (ref 34–46.6)
HCV AB S/CO SERPL IA: <0.1 S/CO RATIO (ref 0–0.9)
HGB BLD-MCNC: 13.2 G/DL (ref 11.1–15.9)
HIV 1+2 AB+HIV1 P24 AG SERPL QL IA: NON REACTIVE
IMM GRANULOCYTES # BLD AUTO: 0 X10E3/UL (ref 0–0.1)
IMM GRANULOCYTES NFR BLD AUTO: 0 %
LYMPHOCYTES # BLD AUTO: 1.9 X10E3/UL (ref 0.7–3.1)
LYMPHOCYTES NFR BLD AUTO: 22 %
MCH RBC QN AUTO: 27.1 PG (ref 26.6–33)
MCHC RBC AUTO-ENTMCNC: 33.4 G/DL (ref 31.5–35.7)
MCV RBC AUTO: 81 FL (ref 79–97)
MONOCYTES # BLD AUTO: 0.6 X10E3/UL (ref 0.1–0.9)
MONOCYTES NFR BLD AUTO: 7 %
N GONORRHOEA RRNA SPEC QL NAA+PROBE: NEGATIVE
NEUTROPHILS # BLD AUTO: 6.2 X10E3/UL (ref 1.4–7)
NEUTROPHILS NFR BLD AUTO: 70 %
PLATELET # BLD AUTO: 210 X10E3/UL (ref 150–450)
RBC # BLD AUTO: 4.87 X10E6/UL (ref 3.77–5.28)
RH BLD: POSITIVE
RPR SER QL: NON REACTIVE
RUBV IGG SERPL IA-ACNC: 10.6 INDEX
WBC # BLD AUTO: 8.8 X10E3/UL (ref 3.4–10.8)

## 2020-11-30 ENCOUNTER — ROUTINE PRENATAL (OUTPATIENT)
Dept: OBSTETRICS AND GYNECOLOGY | Facility: CLINIC | Age: 30
End: 2020-11-30

## 2020-11-30 VITALS — DIASTOLIC BLOOD PRESSURE: 62 MMHG | WEIGHT: 130 LBS | SYSTOLIC BLOOD PRESSURE: 100 MMHG | BODY MASS INDEX: 19.2 KG/M2

## 2020-11-30 DIAGNOSIS — Z3A.12 12 WEEKS GESTATION OF PREGNANCY: Primary | ICD-10-CM

## 2020-11-30 PROCEDURE — 0502F SUBSEQUENT PRENATAL CARE: CPT | Performed by: NURSE PRACTITIONER

## 2020-11-30 NOTE — PROGRESS NOTES
OB FOLLOW UP  CC- Here for care of pregnancy        Alyse Bojorquez is a 30 y.o.  12w2d patient being seen today for her obstetrical follow up visit. Patient reports occ mild pelvic pain and hip pain, mostly in the mornings.    Her prenatal care is complicated by (and status) :    Patient Active Problem List   Diagnosis   • Pregnancy   • External hemorrhoids       Desires genetic testing?: No  Flu Status: Already given in current flu season  Ultrasound Today: No.    ROS -   Patient Reports : Nausea and Vomiting. Last vomiting was 9 days ago.  Patient Denies: Vaginal Spotting  Fetal Movement : too early  All other systems reviewed and are negative.     The additional following portions of the patient's history were reviewed and updated as appropriate: allergies, current medications, past family history, past medical history, past social history, past surgical history and problem list.    I have reviewed and agree with the HPI, ROS, and historical information as entered above. Alejandra Clarke, APRN    /62   Wt 59 kg (130 lb)   LMP 2020   BMI 19.20 kg/m²         EXAM:     FHT: 170 BPM   Pelvic Exam: No       Assessment and Plan    Problem List Items Addressed This Visit        Other    Pregnancy - Primary          1. Pregnancy at 12w2d  2. Labs reviewed from New OB Visit.  3. Activity, Exercise and diet discussed. Declines genetic testing.   Return in about 4 weeks (around 2020).    Alejandra Clarke, ASHLI  2020

## 2020-12-29 ENCOUNTER — ROUTINE PRENATAL (OUTPATIENT)
Dept: OBSTETRICS AND GYNECOLOGY | Facility: CLINIC | Age: 30
End: 2020-12-29

## 2020-12-29 VITALS — WEIGHT: 133 LBS | SYSTOLIC BLOOD PRESSURE: 104 MMHG | BODY MASS INDEX: 19.64 KG/M2 | DIASTOLIC BLOOD PRESSURE: 58 MMHG

## 2020-12-29 DIAGNOSIS — Z3A.16 16 WEEKS GESTATION OF PREGNANCY: Primary | ICD-10-CM

## 2020-12-29 DIAGNOSIS — K64.4 EXTERNAL HEMORRHOIDS: ICD-10-CM

## 2020-12-29 LAB
GLUCOSE UR STRIP-MCNC: NEGATIVE MG/DL
PROT UR STRIP-MCNC: NEGATIVE MG/DL

## 2020-12-29 PROCEDURE — 0502F SUBSEQUENT PRENATAL CARE: CPT | Performed by: OBSTETRICS & GYNECOLOGY

## 2020-12-29 NOTE — PROGRESS NOTES
OB FOLLOW UP  CC- Here for care of pregnancy        Alyse Bojorquez is a 30 y.o.  16w3d patient being seen today for her obstetrical follow up visit. Patient reports nausea.     Her prenatal care is complicated by (and status) :    Patient Active Problem List   Diagnosis   • Pregnancy   • External hemorrhoids       Flu Status: Already given in current flu season  Ultrasound Today: No.    ROS -   Patient Reports : Nausea  Patient Denies: Vaginal Spotting, Vomiting  and Contractions  Fetal Movement : none yet  All other systems reviewed and are negative.       The additional following portions of the patient's history were reviewed and updated as appropriate: allergies, current medications, past family history, past medical history, past social history, past surgical history and problem list.    I have reviewed and agree with the HPI, ROS, and historical information as entered above. Kavitha Paniagua MD    /58   Wt 60.3 kg (133 lb)   LMP 2020   BMI 19.64 kg/m²       EXAM:     FHT: 159 BPM   Uterine Size: size equals dates  Pelvic Exam: No    Urine glucose/protein: See prenatal flowsheet       Assessment and Plan    Problem List Items Addressed This Visit        Cardiovascular and Mediastinum    External hemorrhoids    Overview     Followed by Dr. Bowden.             Other    Pregnancy - Primary    Relevant Orders    POC Urinalysis Dipstick (Completed)    US Ob 14 + Weeks Single or First Gestation          1. Pregnancy at 16w3d  2. Fetal status reassuring.   3. Activity and Exercise discussed.  Return in about 4 weeks (around 2021) for anatomy usg.    Kavitha Paniagua MD  2020

## 2021-01-25 ENCOUNTER — ROUTINE PRENATAL (OUTPATIENT)
Dept: OBSTETRICS AND GYNECOLOGY | Facility: CLINIC | Age: 31
End: 2021-01-25

## 2021-01-25 VITALS — DIASTOLIC BLOOD PRESSURE: 60 MMHG | SYSTOLIC BLOOD PRESSURE: 138 MMHG | BODY MASS INDEX: 20.23 KG/M2 | WEIGHT: 137 LBS

## 2021-01-25 DIAGNOSIS — Z3A.20 20 WEEKS GESTATION OF PREGNANCY: Primary | ICD-10-CM

## 2021-01-25 LAB
GLUCOSE UR STRIP-MCNC: NEGATIVE MG/DL
PROT UR STRIP-MCNC: NEGATIVE MG/DL

## 2021-01-25 PROCEDURE — 0502F SUBSEQUENT PRENATAL CARE: CPT | Performed by: OBSTETRICS & GYNECOLOGY

## 2021-01-25 NOTE — PROGRESS NOTES
OB FOLLOW UP  CC- Here for care of pregnancy        Alyse Bojorquez is a 30 y.o.  20w2d patient being seen today for her obstetrical follow up visit. Patient reports no complaints. Patient denies h/a-states nervous today.     Her prenatal care is complicated by (and status) :    Patient Active Problem List   Diagnosis   • Pregnancy   • External hemorrhoids       Flu Status: Already given in current flu season  Ultrasound Today: Yes.  Findings showed normal anatomy.  Male fetus..  I have personally evaluated the U/S and agree with the findings. Kavitha Paniagua MD    ROS -   Patient Reports : No Problems  Patient Denies: Loss of Fluid and Vaginal Spotting  Fetal Movement : NA  All other systems reviewed and are negative.       The additional following portions of the patient's history were reviewed and updated as appropriate: allergies.    I have reviewed and agree with the HPI, ROS, and historical information as entered above. Kavitha Paniagua MD    /60   Wt 62.1 kg (137 lb)   LMP 2020   BMI 20.23 kg/m²       EXAM:     FHT: + on usg BPM   Uterine Size: size equals dates  Pelvic Exam: No    Urine glucose/protein: See prenatal flowsheet       Assessment and Plan    Problem List Items Addressed This Visit        Other    Pregnancy - Primary    Overview     BOY!         Relevant Orders    POC Urinalysis Dipstick (Completed)          1. Pregnancy at 20w2d  2. Fetal status reassuring.   3. Activity and Exercise discussed.  Return in about 4 weeks (around 2021).    Kavitha Paniagua MD  2021

## 2021-02-22 ENCOUNTER — ROUTINE PRENATAL (OUTPATIENT)
Dept: OBSTETRICS AND GYNECOLOGY | Facility: CLINIC | Age: 31
End: 2021-02-22

## 2021-02-22 VITALS — DIASTOLIC BLOOD PRESSURE: 80 MMHG | WEIGHT: 144 LBS | SYSTOLIC BLOOD PRESSURE: 120 MMHG | BODY MASS INDEX: 21.27 KG/M2

## 2021-02-22 DIAGNOSIS — Z3A.24 24 WEEKS GESTATION OF PREGNANCY: Primary | ICD-10-CM

## 2021-02-22 LAB
GLUCOSE UR STRIP-MCNC: NEGATIVE MG/DL
RBC # UR STRIP: NEGATIVE /UL

## 2021-02-22 PROCEDURE — 0502F SUBSEQUENT PRENATAL CARE: CPT | Performed by: OBSTETRICS & GYNECOLOGY

## 2021-02-22 RX ORDER — PRENATAL VIT NO.126/IRON/FOLIC 28MG-0.8MG
TABLET ORAL DAILY
COMMUNITY
End: 2022-07-28

## 2021-02-22 RX ORDER — CALCIUM POLYCARBOPHIL 625 MG
TABLET ORAL DAILY
COMMUNITY
End: 2022-07-28

## 2021-02-22 NOTE — PROGRESS NOTES
OB FOLLOW UP  CC- Here for care of pregnancy        Alyse Bojorquez is a 30 y.o.  24w2d patient being seen today for her obstetrical follow up visit. Patient reports no complaints.     Her prenatal care is complicated by (and status) :    Patient Active Problem List   Diagnosis   • Pregnancy   • External hemorrhoids       Flu Status: Already given in current flu season  Ultrasound Today: No.    ROS -   Patient Reports : Headaches  Patient Denies: Loss of Fluid, Vaginal Spotting, Vision Changes and Contractions  Fetal Movement : normal  All other systems reviewed and are negative.       The additional following portions of the patient's history were reviewed and updated as appropriate: allergies, current medications, past family history, past medical history, past social history, past surgical history and problem list.    I have reviewed and agree with the HPI, ROS, and historical information as entered above. Kavitha Paniagua MD    /80   Wt 65.3 kg (144 lb)   LMP 2020   BMI 21.27 kg/m²       EXAM:     FHT: 150 BPM   Uterine Size: 24 cm  Pelvic Exam: No    Urine glucose/protein: See prenatal flowsheet       Assessment and Plan    Problem List Items Addressed This Visit        Gravid and     Pregnancy - Primary    Overview     BOY!         Relevant Orders    POC Urinalysis Dipstick (Completed)          1. Pregnancy at 24w2d  2. Fetal status reassuring.   3. Activity and Exercise discussed.  Return in about 4 weeks (around 3/22/2021) for glucola.    Kavitha Paniagua MD  2021

## 2021-03-23 ENCOUNTER — ROUTINE PRENATAL (OUTPATIENT)
Dept: OBSTETRICS AND GYNECOLOGY | Facility: CLINIC | Age: 31
End: 2021-03-23

## 2021-03-23 VITALS — DIASTOLIC BLOOD PRESSURE: 72 MMHG | BODY MASS INDEX: 22.15 KG/M2 | SYSTOLIC BLOOD PRESSURE: 120 MMHG | WEIGHT: 150 LBS

## 2021-03-23 DIAGNOSIS — Z3A.28 28 WEEKS GESTATION OF PREGNANCY: Primary | ICD-10-CM

## 2021-03-23 LAB
GLUCOSE UR STRIP-MCNC: NEGATIVE MG/DL
PROT UR STRIP-MCNC: NEGATIVE MG/DL

## 2021-03-23 PROCEDURE — 90471 IMMUNIZATION ADMIN: CPT | Performed by: OBSTETRICS & GYNECOLOGY

## 2021-03-23 PROCEDURE — 90715 TDAP VACCINE 7 YRS/> IM: CPT | Performed by: OBSTETRICS & GYNECOLOGY

## 2021-03-23 PROCEDURE — 0502F SUBSEQUENT PRENATAL CARE: CPT | Performed by: OBSTETRICS & GYNECOLOGY

## 2021-03-23 NOTE — PROGRESS NOTES
OB FOLLOW UP  CC- Here for care of pregnancy        Alyse Bojorquez is a 30 y.o.  28w3d patient being seen today for her obstetrical follow up. Patient reports no complaints.     Patient undergoing Glucola testing today. She is due for her testing at 0918.     MBT: O+  Rhogam Given: N/A  TDAP: given today  Breast Pump: info given for online rx  Flu Status: Already given in current flu season  Ultrasound Today: No.    Her prenatal care is complicated by (and status) :    Patient Active Problem List   Diagnosis   • Pregnancy   • External hemorrhoids         ROS -   Patient Reports : No Problems  Patient Denies: Loss of Fluid, Vaginal Spotting, Vision Changes, Headaches and Contractions  Fetal Movement : normal    The additional following portions of the patient's history were reviewed and updated as appropriate: allergies, current medications, past family history, past medical history, past social history, past surgical history and problem list.    I have reviewed and agree with the HPI, ROS, and historical information as entered above. Kavitha Paniagua MD    /72   Wt 68 kg (150 lb)   LMP 2020   BMI 22.15 kg/m²         EXAM:     FHT: 145 BPM   Uterine Size: 27 cm  Pelvic Exam: No       Assessment and Plan    Problem List Items Addressed This Visit        Gravid and     Pregnancy - Primary    Overview     BOY!         Relevant Orders    Gestational Screen 1 Hr ([facility])    Antibody Screen    CBC (No Diff)    POC Urinalysis Dipstick (Completed)          1. Pregnancy at 28w3d  2. Fetal status reassuring.   3. Activity and Exercise discussed.  Return in about 4 weeks (around 2021).    Kavitha Paniagua MD  2021

## 2021-03-24 LAB
BLD GP AB SCN SERPL QL: NEGATIVE
ERYTHROCYTE [DISTWIDTH] IN BLOOD BY AUTOMATED COUNT: 12.8 % (ref 12.3–15.4)
GLUCOSE 1H P 50 G GLC PO SERPL-MCNC: 72 MG/DL (ref 65–139)
HCT VFR BLD AUTO: 36.9 % (ref 34–46.6)
HGB BLD-MCNC: 12.4 G/DL (ref 12–15.9)
MCH RBC QN AUTO: 28.2 PG (ref 26.6–33)
MCHC RBC AUTO-ENTMCNC: 33.6 G/DL (ref 31.5–35.7)
MCV RBC AUTO: 84.1 FL (ref 79–97)
PLATELET # BLD AUTO: 174 10*3/MM3 (ref 140–450)
RBC # BLD AUTO: 4.39 10*6/MM3 (ref 3.77–5.28)
WBC # BLD AUTO: 13.34 10*3/MM3 (ref 3.4–10.8)

## 2021-04-20 ENCOUNTER — ROUTINE PRENATAL (OUTPATIENT)
Dept: OBSTETRICS AND GYNECOLOGY | Facility: CLINIC | Age: 31
End: 2021-04-20

## 2021-04-20 VITALS — SYSTOLIC BLOOD PRESSURE: 122 MMHG | DIASTOLIC BLOOD PRESSURE: 76 MMHG | BODY MASS INDEX: 22.77 KG/M2 | WEIGHT: 154.2 LBS

## 2021-04-20 DIAGNOSIS — Z3A.32 32 WEEKS GESTATION OF PREGNANCY: Primary | ICD-10-CM

## 2021-04-20 LAB
EXPIRATION DATE: 0
GLUCOSE UR STRIP-MCNC: NEGATIVE MG/DL
Lab: 0
PROT UR STRIP-MCNC: NEGATIVE MG/DL

## 2021-04-20 PROCEDURE — 0502F SUBSEQUENT PRENATAL CARE: CPT | Performed by: NURSE PRACTITIONER

## 2021-04-20 NOTE — PROGRESS NOTES
OB FOLLOW UP  CC- Here for care of pregnancy        Alyse Bojorquez is a 30 y.o.  32w3d patient being seen today for her obstetrical follow up visit. Patient reports cramping.     Patient states she has noticed some cramping in the morning when shes getting ready for work but states it goes away after about 20 minutes.    Her prenatal care is complicated by (and status) :    Patient Active Problem List   Diagnosis   • Pregnancy   • External hemorrhoids       Ultrasound Today: No.    ROS -   Patient Reports : Cramping  Patient Denies: Loss of Fluid, Vaginal Spotting, Vision Changes, Headaches, Nausea , Vomiting , Contractions and Epigastric pain  Fetal Movement : normal  All other systems reviewed and are negative.       The additional following portions of the patient's history were reviewed and updated as appropriate: allergies, current medications, past family history, past medical history, past social history and past surgical history.    I have reviewed and agree with the HPI, ROS, and historical information as entered above. Kim Cruz, APRN    /76   Wt 69.9 kg (154 lb 3.2 oz)   LMP 2020   BMI 22.77 kg/m²       EXAM:     FHT: wnl BPM   Uterine Size: size equals dates  Pelvic Exam: No    Urine glucose/protein: See prenatal flowsheet       Assessment and Plan    Problem List Items Addressed This Visit        Gravid and     Pregnancy - Primary    Overview     BOY!         Relevant Orders    POC Glucose, Urine, Qualitative, Dipstick (Completed)    POC Protein, Urine, Qualitative, Dipstick (Completed)          1. Pregnancy at 32w3d  2. Rev daily FMC, BG prec, preecl prec.    3. Activity and Exercise discussed.  Return in about 2 weeks (around 2021).    Kim Cruz, ASHLI  2021

## 2021-05-03 ENCOUNTER — ROUTINE PRENATAL (OUTPATIENT)
Dept: OBSTETRICS AND GYNECOLOGY | Facility: CLINIC | Age: 31
End: 2021-05-03

## 2021-05-03 VITALS — BODY MASS INDEX: 23.04 KG/M2 | DIASTOLIC BLOOD PRESSURE: 80 MMHG | SYSTOLIC BLOOD PRESSURE: 128 MMHG | WEIGHT: 156 LBS

## 2021-05-03 DIAGNOSIS — K64.4 EXTERNAL HEMORRHOIDS: ICD-10-CM

## 2021-05-03 DIAGNOSIS — Z3A.34 34 WEEKS GESTATION OF PREGNANCY: Primary | ICD-10-CM

## 2021-05-03 LAB
GLUCOSE UR STRIP-MCNC: NEGATIVE MG/DL
PROT UR STRIP-MCNC: NEGATIVE MG/DL

## 2021-05-03 PROCEDURE — 0502F SUBSEQUENT PRENATAL CARE: CPT | Performed by: OBSTETRICS & GYNECOLOGY

## 2021-05-03 NOTE — PROGRESS NOTES
OB FOLLOW UP  CC- Here for care of pregnancy        Alyse Bojorquez is a 30 y.o.  34w2d patient being seen today for her obstetrical follow up visit. Patient reports cramping-worse in the AM.     Her prenatal care is complicated by (and status) :    Patient Active Problem List   Diagnosis   • Pregnancy   • External hemorrhoids       Flu Status: Already given in current flu season  Ultrasound Today: No.    ROS -   Patient Reports : Cramping  Patient Denies: Loss of Fluid and Vaginal Spotting  Fetal Movement : normal  All other systems reviewed and are negative.       The additional following portions of the patient's history were reviewed and updated as appropriate: allergies.    I have reviewed and agree with the HPI, ROS, and historical information as entered above. Kavitha Paniagua MD    /80   Wt 70.8 kg (156 lb)   LMP 2020   BMI 23.04 kg/m²       EXAM:     FHT: 144 BPM   Uterine Size: 33 cm  Pelvic Exam: No    Urine glucose/protein: See prenatal flowsheet       Assessment and Plan    Problem List Items Addressed This Visit        Gastrointestinal Abdominal     External hemorrhoids    Overview     Followed by Dr. Bowden.             Gravid and     Pregnancy - Primary    Overview     BOY!         Relevant Orders    POC Urinalysis Dipstick (Completed)          1. Pregnancy at 34w2d  2. Fetal status reassuring.   3. Activity and Exercise discussed.  Return in about 2 weeks (around 2021).    Kavitha Paniagua MD  2021

## 2021-05-17 ENCOUNTER — LAB (OUTPATIENT)
Dept: LAB | Facility: HOSPITAL | Age: 31
End: 2021-05-17

## 2021-05-17 ENCOUNTER — ROUTINE PRENATAL (OUTPATIENT)
Dept: OBSTETRICS AND GYNECOLOGY | Facility: CLINIC | Age: 31
End: 2021-05-17

## 2021-05-17 VITALS — WEIGHT: 159 LBS | BODY MASS INDEX: 23.48 KG/M2 | DIASTOLIC BLOOD PRESSURE: 72 MMHG | SYSTOLIC BLOOD PRESSURE: 134 MMHG

## 2021-05-17 DIAGNOSIS — Z3A.36 36 WEEKS GESTATION OF PREGNANCY: Primary | ICD-10-CM

## 2021-05-17 DIAGNOSIS — O26.849 UTERINE SIZE DATE DISCREPANCY PREGNANCY: ICD-10-CM

## 2021-05-17 LAB
GLUCOSE UR STRIP-MCNC: NEGATIVE MG/DL
PROT UR STRIP-MCNC: NEGATIVE MG/DL

## 2021-05-17 PROCEDURE — 0502F SUBSEQUENT PRENATAL CARE: CPT | Performed by: OBSTETRICS & GYNECOLOGY

## 2021-05-17 PROCEDURE — 87186 SC STD MICRODIL/AGAR DIL: CPT

## 2021-05-17 PROCEDURE — 87081 CULTURE SCREEN ONLY: CPT

## 2021-05-17 NOTE — PROGRESS NOTES
OB FOLLOW UP  CC- Here for care of pregnancy        Alyse Bojorquez is a 30 y.o.  36w2d patient being seen today for her obstetrical follow up visit. Patient reports occasional contractions and cramping, increased vaginal d/c.     Her prenatal care is complicated by (and status) :    Patient Active Problem List   Diagnosis   • Pregnancy   • External hemorrhoids         Flu Status: Already given in current flu season  GBS Status: Done Today  Her Delivery Plan is: Undecided  Ultrasound Today: No.    ROS -   Patient Reports : BH cxt, cramping  Patient Denies: Loss of Fluid and Vaginal Spotting  Fetal Movement : normal  All other systems reviewed and are negative.       The additional following portions of the patient's history were reviewed and updated as appropriate: allergies and current medications.    I have reviewed and agree with the HPI, ROS, and historical information as entered above. Kavitha Paniagua MD        /72   Wt 72.1 kg (159 lb)   LMP 2020   BMI 23.48 kg/m²     EXAM:     FHT: 128 BPM   Uterine Size: 34 cm  Pelvic Exam: Yes.  Presentation: cephalic. Dilation: Closed. Effacement: 50%. Station: -2.    Urine glucose/protein: See prenatal flowsheet       Assessment and Plan    Problem List Items Addressed This Visit        Gravid and     Pregnancy - Primary    Overview     BOY!         Relevant Orders    Group B Streptococcus Culture - Swab, Vagina    POC Urinalysis Dipstick (Completed)          1. Pregnancy at 36w2d  2. Fetal status reassuring.   3. Activity and Exercise discussed.  Return in about 1 week (around 2021).    Kavitha Paniagua MD  2021

## 2021-05-20 ENCOUNTER — TELEPHONE (OUTPATIENT)
Dept: OBSTETRICS AND GYNECOLOGY | Facility: CLINIC | Age: 31
End: 2021-05-20

## 2021-05-20 NOTE — TELEPHONE ENCOUNTER
Patient left a message stating that she is 37 weeks and is having a little bit more than normal discharge that is a different color. She wants to make sure that there isn't anything she should be doing differently.

## 2021-05-20 NOTE — TELEPHONE ENCOUNTER
Irreg CTX's, denies bloody show or ROM. She is making sure she feels 10 movements in 10 hours or less and will go to  for any after hours concerns. Pt aware GBS+

## 2021-05-21 PROBLEM — B95.1 POSITIVE GBS TEST: Status: ACTIVE | Noted: 2021-05-21

## 2021-05-24 ENCOUNTER — ROUTINE PRENATAL (OUTPATIENT)
Dept: OBSTETRICS AND GYNECOLOGY | Facility: CLINIC | Age: 31
End: 2021-05-24

## 2021-05-24 VITALS — SYSTOLIC BLOOD PRESSURE: 116 MMHG | WEIGHT: 162.8 LBS | BODY MASS INDEX: 24.04 KG/M2 | DIASTOLIC BLOOD PRESSURE: 60 MMHG

## 2021-05-24 DIAGNOSIS — K64.4 EXTERNAL HEMORRHOIDS: ICD-10-CM

## 2021-05-24 DIAGNOSIS — Z3A.37 37 WEEKS GESTATION OF PREGNANCY: Primary | ICD-10-CM

## 2021-05-24 DIAGNOSIS — B95.1 POSITIVE GBS TEST: ICD-10-CM

## 2021-05-24 LAB
EXPIRATION DATE: 0
GLUCOSE UR STRIP-MCNC: NEGATIVE MG/DL
Lab: 0
PROT UR STRIP-MCNC: NEGATIVE MG/DL

## 2021-05-24 PROCEDURE — 0502F SUBSEQUENT PRENATAL CARE: CPT | Performed by: OBSTETRICS & GYNECOLOGY

## 2021-05-24 NOTE — PROGRESS NOTES
OB FOLLOW UP  CC- Here for care of pregnancy        Alyse Bojorquez is a 30 y.o.  37w2d patient being seen today for her obstetrical follow up visit. Patient reports no complaints.     Her prenatal care is complicated by (and status) :    Patient Active Problem List   Diagnosis   • Pregnancy   • External hemorrhoids   • Positive GBS test         Flu Status: not flu season   GBS Status: Was already done and it was positive.   Her Delivery Plan is: will discuss with Dr today.   Ultrasound Today: Yes.  Findings showed EFW 21st % AC 6th% BPP  MYRNA 15.  I have personally evaluated the U/S and agree with the findings. Kavitha Paniagua MD    ROS -   Patient Reports : No Problems  Patient Denies: Loss of Fluid, Vaginal Spotting, Vision Changes, Headaches, Nausea , Vomiting , Contractions and Epigastric pain  Fetal Movement : normal  All other systems reviewed and are negative.       The additional following portions of the patient's history were reviewed and updated as appropriate: current medications, past family history, past medical history, past social history, past surgical history and problem list.    I have reviewed and agree with the HPI, ROS, and historical information as entered above. Kavitha Paniagua MD        /60   Wt 73.8 kg (162 lb 12.8 oz)   LMP 2020   BMI 24.04 kg/m²     EXAM:     FHT: 141 BPM   Uterine Size: size less than dates  Pelvic Exam: Yes.  Presentation: cephalic. Dilation: Fingertip. Effacement: 50%. Station: -2.    Urine glucose/protein: See prenatal flowsheet       Assessment and Plan    Problem List Items Addressed This Visit        Gastrointestinal Abdominal     External hemorrhoids    Overview     Followed by Dr. Bowden.             Gravid and     Pregnancy - Primary    Overview     BOY!         Relevant Orders    POC Glucose, Urine, Qualitative, Dipstick (Completed)    POC Protein, Urine, Qualitative, Dipstick (Completed)       Other    Positive GBS test     Overview     Penicillin allergic.  Will run sensitivities.               1. Pregnancy at 37w2d  2. Fetal status reassuring.   3. Activity and Exercise discussed.  Return in about 1 week (around 5/31/2021).    Kavitha Paniagua MD  05/24/2021

## 2021-05-26 LAB — BACTERIA SPEC AEROBE CULT: ABNORMAL

## 2021-06-01 ENCOUNTER — PREP FOR SURGERY (OUTPATIENT)
Dept: OTHER | Facility: HOSPITAL | Age: 31
End: 2021-06-01

## 2021-06-01 ENCOUNTER — ROUTINE PRENATAL (OUTPATIENT)
Dept: OBSTETRICS AND GYNECOLOGY | Facility: CLINIC | Age: 31
End: 2021-06-01

## 2021-06-01 VITALS — BODY MASS INDEX: 23.92 KG/M2 | SYSTOLIC BLOOD PRESSURE: 130 MMHG | WEIGHT: 162 LBS | DIASTOLIC BLOOD PRESSURE: 80 MMHG

## 2021-06-01 DIAGNOSIS — Z3A.38 38 WEEKS GESTATION OF PREGNANCY: Primary | ICD-10-CM

## 2021-06-01 DIAGNOSIS — B95.1 POSITIVE GBS TEST: ICD-10-CM

## 2021-06-01 DIAGNOSIS — Z3A.39 39 WEEKS GESTATION OF PREGNANCY: Primary | ICD-10-CM

## 2021-06-01 LAB
GLUCOSE UR STRIP-MCNC: NEGATIVE MG/DL
PROT UR STRIP-MCNC: NEGATIVE MG/DL

## 2021-06-01 PROCEDURE — 0502F SUBSEQUENT PRENATAL CARE: CPT | Performed by: OBSTETRICS & GYNECOLOGY

## 2021-06-01 RX ORDER — OXYTOCIN-SODIUM CHLORIDE 0.9% IV SOLN 30 UNIT/500ML 30-0.9/5 UT/ML-%
2-24 SOLUTION INTRAVENOUS
Status: CANCELLED | OUTPATIENT
Start: 2021-06-01

## 2021-06-01 RX ORDER — CARBOPROST TROMETHAMINE 250 UG/ML
250 INJECTION, SOLUTION INTRAMUSCULAR AS NEEDED
Status: CANCELLED | OUTPATIENT
Start: 2021-06-01

## 2021-06-01 RX ORDER — BUTORPHANOL TARTRATE 1 MG/ML
1 INJECTION, SOLUTION INTRAMUSCULAR; INTRAVENOUS
Status: CANCELLED | OUTPATIENT
Start: 2021-06-01

## 2021-06-01 RX ORDER — SODIUM CHLORIDE 0.9 % (FLUSH) 0.9 %
3 SYRINGE (ML) INJECTION EVERY 12 HOURS SCHEDULED
Status: CANCELLED | OUTPATIENT
Start: 2021-06-01

## 2021-06-01 RX ORDER — VANCOMYCIN HYDROCHLORIDE 1 G/200ML
1 INJECTION, SOLUTION INTRAVENOUS EVERY 12 HOURS
Status: CANCELLED | OUTPATIENT
Start: 2021-06-01 | End: 2021-06-03

## 2021-06-01 RX ORDER — SODIUM CHLORIDE, SODIUM LACTATE, POTASSIUM CHLORIDE, CALCIUM CHLORIDE 600; 310; 30; 20 MG/100ML; MG/100ML; MG/100ML; MG/100ML
125 INJECTION, SOLUTION INTRAVENOUS CONTINUOUS
Status: CANCELLED | OUTPATIENT
Start: 2021-06-01

## 2021-06-01 RX ORDER — MAGNESIUM CARB/ALUMINUM HYDROX 105-160MG
30 TABLET,CHEWABLE ORAL ONCE
Status: CANCELLED | OUTPATIENT
Start: 2021-06-01 | End: 2021-06-01

## 2021-06-01 RX ORDER — OXYTOCIN-SODIUM CHLORIDE 0.9% IV SOLN 30 UNIT/500ML 30-0.9/5 UT/ML-%
85 SOLUTION INTRAVENOUS ONCE
Status: CANCELLED | OUTPATIENT
Start: 2021-06-01 | End: 2021-06-01

## 2021-06-01 RX ORDER — OXYTOCIN-SODIUM CHLORIDE 0.9% IV SOLN 30 UNIT/500ML 30-0.9/5 UT/ML-%
650 SOLUTION INTRAVENOUS ONCE
Status: CANCELLED | OUTPATIENT
Start: 2021-06-01 | End: 2021-06-01

## 2021-06-01 RX ORDER — MISOPROSTOL 200 UG/1
800 TABLET ORAL AS NEEDED
Status: CANCELLED | OUTPATIENT
Start: 2021-06-01

## 2021-06-01 RX ORDER — MISOPROSTOL 100 MCG
25 TABLET ORAL ONCE
Status: CANCELLED | OUTPATIENT
Start: 2021-06-01 | End: 2021-06-01

## 2021-06-01 RX ORDER — METHYLERGONOVINE MALEATE 0.2 MG/ML
200 INJECTION INTRAVENOUS ONCE AS NEEDED
Status: CANCELLED | OUTPATIENT
Start: 2021-06-01

## 2021-06-01 RX ORDER — SODIUM CHLORIDE 0.9 % (FLUSH) 0.9 %
1-10 SYRINGE (ML) INJECTION AS NEEDED
Status: CANCELLED | OUTPATIENT
Start: 2021-06-01

## 2021-06-01 NOTE — PROGRESS NOTES
OB FOLLOW UP  CC- Here for care of pregnancy        Alyse Bojorquez is a 30 y.o.  38w3d patient being seen today for her obstetrical follow up visit. Patient reports occasional contractions.     Her prenatal care is complicated by (and status) :    Patient Active Problem List   Diagnosis   • Pregnancy   • External hemorrhoids   • Positive GBS test           GBS Status: Was already done and it was positive.   Her Delivery Plan is: Desires IOL at 39wks. Scheduled  Ultrasound Today: No.    ROS -   Patient Reports : No Problems  Patient Denies: Loss of Fluid, Vaginal Spotting, Vision Changes and Headaches  Fetal Movement : normal  All other systems reviewed and are negative.       The additional following portions of the patient's history were reviewed and updated as appropriate: allergies, current medications, past family history, past medical history, past social history, past surgical history and problem list.    I have reviewed and agree with the HPI, ROS, and historical information as entered above. Kavitha Paniagua MD        /80   Wt 73.5 kg (162 lb)   LMP 2020   BMI 23.92 kg/m²     EXAM:     FHT: 143 BPM   Uterine Size: 37 cm  Pelvic Exam: Yes.  Presentation: cephalic. Dilation: Closed. Effacement: 50%. Station: -2.    Urine glucose/protein: See prenatal flowsheet       Assessment and Plan    Problem List Items Addressed This Visit        Gravid and     Pregnancy - Primary    Overview     BOY!    IOL  @ 9PM         Relevant Orders    POC Urinalysis Dipstick (Completed)       Other    Positive GBS test    Overview     Penicillin allergic.  Will run sensitivities-assistant to clindamycin.  We will treat with vancomycin.               1. Pregnancy at 38w3d  2. Fetal status reassuring.   3. Activity and Exercise discussed.  Return in about 7 weeks (around 2021) for Postpartum check.    Kavitha Paniagua MD  2021

## 2021-06-02 ENCOUNTER — APPOINTMENT (OUTPATIENT)
Dept: PREADMISSION TESTING | Facility: HOSPITAL | Age: 31
End: 2021-06-02

## 2021-06-02 LAB — SARS-COV-2 RNA PNL SPEC NAA+PROBE: NOT DETECTED

## 2021-06-02 PROCEDURE — C9803 HOPD COVID-19 SPEC COLLECT: HCPCS

## 2021-06-02 PROCEDURE — U0004 COV-19 TEST NON-CDC HGH THRU: HCPCS

## 2021-06-05 ENCOUNTER — ANESTHESIA (OUTPATIENT)
Dept: LABOR AND DELIVERY | Facility: HOSPITAL | Age: 31
End: 2021-06-05

## 2021-06-05 ENCOUNTER — ANESTHESIA EVENT (OUTPATIENT)
Dept: LABOR AND DELIVERY | Facility: HOSPITAL | Age: 31
End: 2021-06-05

## 2021-06-05 ENCOUNTER — HOSPITAL ENCOUNTER (OUTPATIENT)
Dept: LABOR AND DELIVERY | Facility: HOSPITAL | Age: 31
Discharge: HOME | End: 2021-06-05

## 2021-06-05 ENCOUNTER — HOSPITAL ENCOUNTER (INPATIENT)
Facility: HOSPITAL | Age: 31
LOS: 3 days | Discharge: HOME | End: 2021-06-08
Attending: OBSTETRICS & GYNECOLOGY | Admitting: OBSTETRICS & GYNECOLOGY

## 2021-06-05 DIAGNOSIS — Z3A.39 39 WEEKS GESTATION OF PREGNANCY: ICD-10-CM

## 2021-06-05 LAB
ABO GROUP BLD: NORMAL
ABO GROUP BLD: NORMAL
AMPHET+METHAMPHET UR QL: NEGATIVE
AMPHETAMINES UR QL: NEGATIVE
ATMOSPHERIC PRESS: ABNORMAL MM[HG]
ATMOSPHERIC PRESS: ABNORMAL MM[HG]
BARBITURATES UR QL SCN: NEGATIVE
BASE EXCESS BLDCOA CALC-SCNC: -0.2 MMOL/L (ref 0–2)
BASE EXCESS BLDCOV CALC-SCNC: -0.2 MMOL/L (ref 0–2)
BASOPHILS # BLD AUTO: 0.05 10*3/MM3 (ref 0–0.2)
BASOPHILS NFR BLD AUTO: 0.3 % (ref 0–1.5)
BDY SITE: ABNORMAL
BDY SITE: ABNORMAL
BENZODIAZ UR QL SCN: NEGATIVE
BLD GP AB SCN SERPL QL: NEGATIVE
BODY TEMPERATURE: 37 C
BODY TEMPERATURE: 37 C
BUPRENORPHINE SERPL-MCNC: NEGATIVE NG/ML
CANNABINOIDS SERPL QL: NEGATIVE
CO2 BLDA-SCNC: 27.8 MMOL/L (ref 22–33)
CO2 BLDA-SCNC: 29.3 MMOL/L (ref 22–33)
COCAINE UR QL: NEGATIVE
COLLECT TME SMN: ABNORMAL
DEPRECATED RDW RBC AUTO: 39.5 FL (ref 37–54)
EOSINOPHIL # BLD AUTO: 0.04 10*3/MM3 (ref 0–0.4)
EOSINOPHIL NFR BLD AUTO: 0.2 % (ref 0.3–6.2)
EPAP: 0
EPAP: 0
ERYTHROCYTE [DISTWIDTH] IN BLOOD BY AUTOMATED COUNT: 13.5 % (ref 12.3–15.4)
HCO3 BLDCOA-SCNC: 27.6 MMOL/L (ref 16.9–20.5)
HCO3 BLDCOV-SCNC: 26.3 MMOL/L (ref 18.6–21.4)
HCT VFR BLD AUTO: 39.5 % (ref 34–46.6)
HGB BLD-MCNC: 13.4 G/DL (ref 12–15.9)
HGB BLDA-MCNC: 15.2 G/DL (ref 14–18)
HGB BLDA-MCNC: 15.4 G/DL (ref 14–18)
IMM GRANULOCYTES # BLD AUTO: 0.12 10*3/MM3 (ref 0–0.05)
IMM GRANULOCYTES NFR BLD AUTO: 0.7 % (ref 0–0.5)
INHALED O2 CONCENTRATION: 21 %
INHALED O2 CONCENTRATION: 21 %
IPAP: 0
IPAP: 0
LYMPHOCYTES # BLD AUTO: 2.2 10*3/MM3 (ref 0.7–3.1)
LYMPHOCYTES NFR BLD AUTO: 13.7 % (ref 19.6–45.3)
MCH RBC QN AUTO: 27.7 PG (ref 26.6–33)
MCHC RBC AUTO-ENTMCNC: 33.9 G/DL (ref 31.5–35.7)
MCV RBC AUTO: 81.6 FL (ref 79–97)
METHADONE UR QL SCN: NEGATIVE
MODALITY: ABNORMAL
MODALITY: ABNORMAL
MONOCYTES # BLD AUTO: 0.9 10*3/MM3 (ref 0.1–0.9)
MONOCYTES NFR BLD AUTO: 5.6 % (ref 5–12)
NEUTROPHILS NFR BLD AUTO: 12.78 10*3/MM3 (ref 1.7–7)
NEUTROPHILS NFR BLD AUTO: 79.5 % (ref 42.7–76)
NOTE: ABNORMAL
NOTE: ABNORMAL
NRBC BLD AUTO-RTO: 0 /100 WBC (ref 0–0.2)
OPIATES UR QL: NEGATIVE
OXYCODONE UR QL SCN: NEGATIVE
PAW @ PEAK INSP FLOW SETTING VENT: 0 CMH2O
PAW @ PEAK INSP FLOW SETTING VENT: 0 CMH2O
PCO2 BLDCOA: 56.2 MMHG (ref 43.3–54.9)
PCO2 BLDCOV: 48.6 MM HG (ref 28–40)
PCP UR QL SCN: NEGATIVE
PH BLDCOA: 7.3 PH UNITS (ref 7.22–7.3)
PH BLDCOV: 7.34 PH UNITS (ref 7.31–7.37)
PLATELET # BLD AUTO: 193 10*3/MM3 (ref 140–450)
PMV BLD AUTO: 10.6 FL (ref 6–12)
PO2 BLDCOA: ABNORMAL MM[HG]
PO2 BLDCOV: 15.8 MM HG (ref 21–31)
PROPOXYPH UR QL: NEGATIVE
RBC # BLD AUTO: 4.84 10*6/MM3 (ref 3.77–5.28)
RH BLD: POSITIVE
RH BLD: POSITIVE
SAO2 % BLDCOA: 8.5 %
SAO2 % BLDCOA: ABNORMAL %
SAO2 % BLDCOV: 33 %
T&S EXPIRATION DATE: NORMAL
TOTAL RATE: 0 BREATHS/MINUTE
TOTAL RATE: 0 BREATHS/MINUTE
TRICYCLICS UR QL SCN: NEGATIVE
VENTILATOR MODE: ABNORMAL
WBC # BLD AUTO: 16.09 10*3/MM3 (ref 3.4–10.8)

## 2021-06-05 PROCEDURE — 59025 FETAL NON-STRESS TEST: CPT

## 2021-06-05 PROCEDURE — 25010000002 DIPHENHYDRAMINE PER 50 MG: Performed by: OBSTETRICS & GYNECOLOGY

## 2021-06-05 PROCEDURE — 86900 BLOOD TYPING SEROLOGIC ABO: CPT

## 2021-06-05 PROCEDURE — 3E0P7VZ INTRODUCTION OF HORMONE INTO FEMALE REPRODUCTIVE, VIA NATURAL OR ARTIFICIAL OPENING: ICD-10-PCS | Performed by: OBSTETRICS & GYNECOLOGY

## 2021-06-05 PROCEDURE — 25010000003 MORPHINE PER 10 MG: Performed by: ANESTHESIOLOGY

## 2021-06-05 PROCEDURE — 86901 BLOOD TYPING SEROLOGIC RH(D): CPT

## 2021-06-05 PROCEDURE — 85025 COMPLETE CBC W/AUTO DIFF WBC: CPT | Performed by: OBSTETRICS & GYNECOLOGY

## 2021-06-05 PROCEDURE — 25010000003 CEFAZOLIN IN DEXTROSE 2-4 GM/100ML-% SOLUTION: Performed by: OBSTETRICS & GYNECOLOGY

## 2021-06-05 PROCEDURE — S0260 H&P FOR SURGERY: HCPCS | Performed by: OBSTETRICS & GYNECOLOGY

## 2021-06-05 PROCEDURE — 80306 DRUG TEST PRSMV INSTRMNT: CPT | Performed by: OBSTETRICS & GYNECOLOGY

## 2021-06-05 PROCEDURE — C1765 ADHESION BARRIER: HCPCS | Performed by: OBSTETRICS & GYNECOLOGY

## 2021-06-05 PROCEDURE — 25010000002 FENTANYL CITRATE (PF) 50 MCG/ML SOLUTION: Performed by: ANESTHESIOLOGY

## 2021-06-05 PROCEDURE — 86850 RBC ANTIBODY SCREEN: CPT | Performed by: OBSTETRICS & GYNECOLOGY

## 2021-06-05 PROCEDURE — 82805 BLOOD GASES W/O2 SATURATION: CPT

## 2021-06-05 PROCEDURE — 59514 CESAREAN DELIVERY ONLY: CPT | Performed by: OBSTETRICS & GYNECOLOGY

## 2021-06-05 PROCEDURE — 25010000002 MIDAZOLAM PER 1 MG: Performed by: ANESTHESIOLOGY

## 2021-06-05 PROCEDURE — 86900 BLOOD TYPING SEROLOGIC ABO: CPT | Performed by: OBSTETRICS & GYNECOLOGY

## 2021-06-05 PROCEDURE — 86901 BLOOD TYPING SEROLOGIC RH(D): CPT | Performed by: OBSTETRICS & GYNECOLOGY

## 2021-06-05 PROCEDURE — 59510 CESAREAN DELIVERY: CPT | Performed by: OBSTETRICS & GYNECOLOGY

## 2021-06-05 PROCEDURE — 25010000002 VANCOMYCIN PER 500 MG: Performed by: OBSTETRICS & GYNECOLOGY

## 2021-06-05 DEVICE — ABSORBABLE ADHESION BARRIER
Type: IMPLANTABLE DEVICE | Status: FUNCTIONAL
Brand: GYNECARE INTERCEED

## 2021-06-05 RX ORDER — TRISODIUM CITRATE DIHYDRATE AND CITRIC ACID MONOHYDRATE 500; 334 MG/5ML; MG/5ML
30 SOLUTION ORAL ONCE
Status: DISCONTINUED | OUTPATIENT
Start: 2021-06-05 | End: 2021-06-06

## 2021-06-05 RX ORDER — SODIUM CHLORIDE 0.9 % (FLUSH) 0.9 %
1-10 SYRINGE (ML) INJECTION AS NEEDED
Status: DISCONTINUED | OUTPATIENT
Start: 2021-06-05 | End: 2021-06-06

## 2021-06-05 RX ORDER — CEFAZOLIN SODIUM 2 G/100ML
2 INJECTION, SOLUTION INTRAVENOUS ONCE
Status: COMPLETED | OUTPATIENT
Start: 2021-06-05 | End: 2021-06-05

## 2021-06-05 RX ORDER — OXYTOCIN-SODIUM CHLORIDE 0.9% IV SOLN 30 UNIT/500ML 30-0.9/5 UT/ML-%
2-24 SOLUTION INTRAVENOUS
Status: DISCONTINUED | OUTPATIENT
Start: 2021-06-05 | End: 2021-06-06

## 2021-06-05 RX ORDER — SODIUM CHLORIDE, SODIUM LACTATE, POTASSIUM CHLORIDE, CALCIUM CHLORIDE 600; 310; 30; 20 MG/100ML; MG/100ML; MG/100ML; MG/100ML
125 INJECTION, SOLUTION INTRAVENOUS CONTINUOUS
Status: DISCONTINUED | OUTPATIENT
Start: 2021-06-05 | End: 2021-06-06

## 2021-06-05 RX ORDER — BUTORPHANOL TARTRATE 1 MG/ML
1 INJECTION, SOLUTION INTRAMUSCULAR; INTRAVENOUS
Status: DISCONTINUED | OUTPATIENT
Start: 2021-06-05 | End: 2021-06-06

## 2021-06-05 RX ORDER — EPHEDRINE SULFATE 5 MG/ML
INJECTION INTRAVENOUS
Status: DISCONTINUED
Start: 2021-06-05 | End: 2021-06-08 | Stop reason: HOSPADM

## 2021-06-05 RX ORDER — BUPIVACAINE HYDROCHLORIDE 7.5 MG/ML
INJECTION, SOLUTION INTRASPINAL
Status: COMPLETED | OUTPATIENT
Start: 2021-06-05 | End: 2021-06-05

## 2021-06-05 RX ORDER — TERBUTALINE SULFATE 1 MG/ML
INJECTION, SOLUTION SUBCUTANEOUS
Status: DISCONTINUED
Start: 2021-06-05 | End: 2021-06-08 | Stop reason: HOSPADM

## 2021-06-05 RX ORDER — ERYTHROMYCIN 5 MG/G
OINTMENT OPHTHALMIC
Status: DISCONTINUED
Start: 2021-06-05 | End: 2021-06-08 | Stop reason: HOSPADM

## 2021-06-05 RX ORDER — ACETAMINOPHEN 500 MG
1000 TABLET ORAL ONCE
Status: DISCONTINUED | OUTPATIENT
Start: 2021-06-05 | End: 2021-06-06

## 2021-06-05 RX ORDER — MAGNESIUM CARB/ALUMINUM HYDROX 105-160MG
30 TABLET,CHEWABLE ORAL ONCE
Status: DISCONTINUED | OUTPATIENT
Start: 2021-06-05 | End: 2021-06-06

## 2021-06-05 RX ORDER — SODIUM CHLORIDE, SODIUM LACTATE, POTASSIUM CHLORIDE, CALCIUM CHLORIDE 600; 310; 30; 20 MG/100ML; MG/100ML; MG/100ML; MG/100ML
INJECTION, SOLUTION INTRAVENOUS CONTINUOUS PRN
Status: DISCONTINUED | OUTPATIENT
Start: 2021-06-05 | End: 2021-06-05 | Stop reason: SURG

## 2021-06-05 RX ORDER — OXYTOCIN-SODIUM CHLORIDE 0.9% IV SOLN 30 UNIT/500ML 30-0.9/5 UT/ML-%
SOLUTION INTRAVENOUS AS NEEDED
Status: DISCONTINUED | OUTPATIENT
Start: 2021-06-05 | End: 2021-06-05 | Stop reason: SURG

## 2021-06-05 RX ORDER — MISOPROSTOL 100 MCG
25 TABLET ORAL ONCE
Status: COMPLETED | OUTPATIENT
Start: 2021-06-05 | End: 2021-06-05

## 2021-06-05 RX ORDER — TRISODIUM CITRATE DIHYDRATE AND CITRIC ACID MONOHYDRATE 500; 334 MG/5ML; MG/5ML
SOLUTION ORAL
Status: DISCONTINUED
Start: 2021-06-05 | End: 2021-06-08 | Stop reason: HOSPADM

## 2021-06-05 RX ORDER — SODIUM CHLORIDE 0.9 % (FLUSH) 0.9 %
3 SYRINGE (ML) INJECTION EVERY 12 HOURS SCHEDULED
Status: DISCONTINUED | OUTPATIENT
Start: 2021-06-05 | End: 2021-06-06

## 2021-06-05 RX ORDER — VANCOMYCIN HYDROCHLORIDE 1 G/200ML
1 INJECTION, SOLUTION INTRAVENOUS EVERY 12 HOURS
Status: DISCONTINUED | OUTPATIENT
Start: 2021-06-05 | End: 2021-06-06

## 2021-06-05 RX ORDER — MORPHINE SULFATE 0.5 MG/ML
INJECTION, SOLUTION EPIDURAL; INTRATHECAL; INTRAVENOUS
Status: COMPLETED | OUTPATIENT
Start: 2021-06-05 | End: 2021-06-05

## 2021-06-05 RX ORDER — MIDAZOLAM HYDROCHLORIDE 1 MG/ML
INJECTION INTRAMUSCULAR; INTRAVENOUS AS NEEDED
Status: DISCONTINUED | OUTPATIENT
Start: 2021-06-05 | End: 2021-06-05 | Stop reason: SURG

## 2021-06-05 RX ORDER — DIPHENHYDRAMINE HYDROCHLORIDE 50 MG/ML
25 INJECTION INTRAMUSCULAR; INTRAVENOUS ONCE
Status: COMPLETED | OUTPATIENT
Start: 2021-06-05 | End: 2021-06-05

## 2021-06-05 RX ORDER — FENTANYL CITRATE 50 UG/ML
INJECTION, SOLUTION INTRAMUSCULAR; INTRAVENOUS
Status: COMPLETED | OUTPATIENT
Start: 2021-06-05 | End: 2021-06-05

## 2021-06-05 RX ADMIN — VANCOMYCIN HYDROCHLORIDE 1 G: 1 INJECTION, SOLUTION INTRAVENOUS at 18:50

## 2021-06-05 RX ADMIN — OXYTOCIN 500 ML: 10 INJECTION INTRAVENOUS at 23:05

## 2021-06-05 RX ADMIN — SODIUM CHLORIDE, POTASSIUM CHLORIDE, SODIUM LACTATE AND CALCIUM CHLORIDE: 600; 310; 30; 20 INJECTION, SOLUTION INTRAVENOUS at 22:37

## 2021-06-05 RX ADMIN — MISOPROSTOL 25 MCG: 100 TABLET ORAL at 18:42

## 2021-06-05 RX ADMIN — DIPHENHYDRAMINE HYDROCHLORIDE 25 MG: 50 INJECTION INTRAMUSCULAR; INTRAVENOUS at 19:52

## 2021-06-05 RX ADMIN — SODIUM CHLORIDE, POTASSIUM CHLORIDE, SODIUM LACTATE AND CALCIUM CHLORIDE 125 ML/HR: 600; 310; 30; 20 INJECTION, SOLUTION INTRAVENOUS at 17:40

## 2021-06-05 RX ADMIN — SODIUM CHLORIDE, POTASSIUM CHLORIDE, SODIUM LACTATE AND CALCIUM CHLORIDE 125 ML/HR: 600; 310; 30; 20 INJECTION, SOLUTION INTRAVENOUS at 21:30

## 2021-06-05 RX ADMIN — SODIUM CHLORIDE, POTASSIUM CHLORIDE, SODIUM LACTATE AND CALCIUM CHLORIDE 999 ML/HR: 600; 310; 30; 20 INJECTION, SOLUTION INTRAVENOUS at 20:46

## 2021-06-05 RX ADMIN — CEFAZOLIN SODIUM 2 G: 10 INJECTION, POWDER, FOR SOLUTION INTRAVENOUS at 22:56

## 2021-06-05 RX ADMIN — MIDAZOLAM 1.5 MG: 1 INJECTION INTRAMUSCULAR; INTRAVENOUS at 22:56

## 2021-06-05 RX ADMIN — FENTANYL CITRATE 15 MCG: 50 INJECTION, SOLUTION INTRAMUSCULAR; INTRAVENOUS at 22:54

## 2021-06-05 RX ADMIN — MIDAZOLAM 2 MG: 1 INJECTION INTRAMUSCULAR; INTRAVENOUS at 23:17

## 2021-06-05 RX ADMIN — SODIUM CHLORIDE, POTASSIUM CHLORIDE, SODIUM LACTATE AND CALCIUM CHLORIDE: 600; 310; 30; 20 INJECTION, SOLUTION INTRAVENOUS at 23:02

## 2021-06-05 RX ADMIN — MORPHINE SULFATE 0.15 MG: 0.5 INJECTION, SOLUTION EPIDURAL; INTRATHECAL; INTRAVENOUS at 22:54

## 2021-06-05 RX ADMIN — BUPIVACAINE HYDROCHLORIDE IN DEXTROSE 1.6 ML: 7.5 INJECTION, SOLUTION SUBARACHNOID at 22:54

## 2021-06-05 NOTE — PROGRESS NOTES
30 y.o.  sat 39w   OB History        1    Para   0    Term   0       0    AB   0    Living   0       SAB   0    TAB   0    Ectopic   0    Molar   0    Multiple   0    Live Births   0             Presents as an induction of labor due to   Her primary OB requests a Ariza Bulb placement to initiate the induction of labor.    Fetal Heart Rate Assessment   Method:     Beats/min:     Baseline:     Varibility:     Accels:     Decels:     Tracing Category:       TOCO  SVE ftip/60/-1    A Ariza Bulb was placed without difficulties with 60 mL of sterile saline.  The patient tolerated the procedure well.

## 2021-06-06 LAB
BASOPHILS # BLD AUTO: 0.05 10*3/MM3 (ref 0–0.2)
BASOPHILS NFR BLD AUTO: 0.3 % (ref 0–1.5)
DEPRECATED RDW RBC AUTO: 41.1 FL (ref 37–54)
EOSINOPHIL # BLD AUTO: 0.03 10*3/MM3 (ref 0–0.4)
EOSINOPHIL NFR BLD AUTO: 0.2 % (ref 0.3–6.2)
ERYTHROCYTE [DISTWIDTH] IN BLOOD BY AUTOMATED COUNT: 13.5 % (ref 12.3–15.4)
HCT VFR BLD AUTO: 36.6 % (ref 34–46.6)
HGB BLD-MCNC: 12 G/DL (ref 12–15.9)
IMM GRANULOCYTES # BLD AUTO: 0.11 10*3/MM3 (ref 0–0.05)
IMM GRANULOCYTES NFR BLD AUTO: 0.6 % (ref 0–0.5)
LYMPHOCYTES # BLD AUTO: 1.86 10*3/MM3 (ref 0.7–3.1)
LYMPHOCYTES NFR BLD AUTO: 10.9 % (ref 19.6–45.3)
MCH RBC QN AUTO: 27.3 PG (ref 26.6–33)
MCHC RBC AUTO-ENTMCNC: 32.8 G/DL (ref 31.5–35.7)
MCV RBC AUTO: 83.4 FL (ref 79–97)
MONOCYTES # BLD AUTO: 0.92 10*3/MM3 (ref 0.1–0.9)
MONOCYTES NFR BLD AUTO: 5.4 % (ref 5–12)
NEUTROPHILS NFR BLD AUTO: 14.15 10*3/MM3 (ref 1.7–7)
NEUTROPHILS NFR BLD AUTO: 82.6 % (ref 42.7–76)
NRBC BLD AUTO-RTO: 0 /100 WBC (ref 0–0.2)
PLATELET # BLD AUTO: 152 10*3/MM3 (ref 140–450)
PMV BLD AUTO: 10.4 FL (ref 6–12)
RBC # BLD AUTO: 4.39 10*6/MM3 (ref 3.77–5.28)
WBC # BLD AUTO: 17.12 10*3/MM3 (ref 3.4–10.8)

## 2021-06-06 PROCEDURE — 25010000002 ONDANSETRON PER 1 MG: Performed by: ANESTHESIOLOGY

## 2021-06-06 PROCEDURE — 25010000002 KETOROLAC TROMETHAMINE PER 15 MG: Performed by: OBSTETRICS & GYNECOLOGY

## 2021-06-06 PROCEDURE — 85025 COMPLETE CBC W/AUTO DIFF WBC: CPT | Performed by: OBSTETRICS & GYNECOLOGY

## 2021-06-06 PROCEDURE — 63710000001 PROMETHAZINE PER 25 MG: Performed by: OBSTETRICS & GYNECOLOGY

## 2021-06-06 RX ORDER — SODIUM CHLORIDE 0.9 % (FLUSH) 0.9 %
1-10 SYRINGE (ML) INJECTION AS NEEDED
Status: DISCONTINUED | OUTPATIENT
Start: 2021-06-06 | End: 2021-06-08 | Stop reason: HOSPADM

## 2021-06-06 RX ORDER — ACETAMINOPHEN 325 MG/1
650 TABLET ORAL EVERY 6 HOURS
Status: DISCONTINUED | OUTPATIENT
Start: 2021-06-07 | End: 2021-06-08 | Stop reason: HOSPADM

## 2021-06-06 RX ORDER — PRENATAL VIT/IRON FUM/FOLIC AC 27MG-0.8MG
1 TABLET ORAL DAILY
Status: DISCONTINUED | OUTPATIENT
Start: 2021-06-06 | End: 2021-06-08 | Stop reason: HOSPADM

## 2021-06-06 RX ORDER — CALCIUM CARBONATE 200(500)MG
1 TABLET,CHEWABLE ORAL EVERY 4 HOURS PRN
Status: DISCONTINUED | OUTPATIENT
Start: 2021-06-06 | End: 2021-06-08 | Stop reason: HOSPADM

## 2021-06-06 RX ORDER — KETOROLAC TROMETHAMINE 15 MG/ML
15 INJECTION, SOLUTION INTRAMUSCULAR; INTRAVENOUS EVERY 6 HOURS
Status: COMPLETED | OUTPATIENT
Start: 2021-06-06 | End: 2021-06-07

## 2021-06-06 RX ORDER — METHYLERGONOVINE MALEATE 0.2 MG/ML
200 INJECTION INTRAVENOUS AS NEEDED
Status: DISCONTINUED | OUTPATIENT
Start: 2021-06-06 | End: 2021-06-06 | Stop reason: HOSPADM

## 2021-06-06 RX ORDER — PROMETHAZINE HYDROCHLORIDE 12.5 MG/1
12.5 SUPPOSITORY RECTAL EVERY 6 HOURS PRN
Status: DISCONTINUED | OUTPATIENT
Start: 2021-06-06 | End: 2021-06-08 | Stop reason: HOSPADM

## 2021-06-06 RX ORDER — MISOPROSTOL 200 UG/1
800 TABLET ORAL AS NEEDED
Status: DISCONTINUED | OUTPATIENT
Start: 2021-06-06 | End: 2021-06-06 | Stop reason: SDUPTHER

## 2021-06-06 RX ORDER — IBUPROFEN 600 MG/1
600 TABLET ORAL EVERY 6 HOURS
Status: DISCONTINUED | OUTPATIENT
Start: 2021-06-07 | End: 2021-06-08 | Stop reason: HOSPADM

## 2021-06-06 RX ORDER — HYDROCORTISONE 25 MG/G
1 CREAM TOPICAL AS NEEDED
Status: DISCONTINUED | OUTPATIENT
Start: 2021-06-06 | End: 2021-06-08 | Stop reason: HOSPADM

## 2021-06-06 RX ORDER — LANOLIN
CREAM (ML) TOPICAL
Status: DISCONTINUED | OUTPATIENT
Start: 2021-06-06 | End: 2021-06-08 | Stop reason: HOSPADM

## 2021-06-06 RX ORDER — PROMETHAZINE HYDROCHLORIDE 25 MG/1
25 TABLET ORAL EVERY 6 HOURS PRN
Status: DISCONTINUED | OUTPATIENT
Start: 2021-06-06 | End: 2021-06-08 | Stop reason: HOSPADM

## 2021-06-06 RX ORDER — CARBOPROST TROMETHAMINE 250 UG/ML
250 INJECTION, SOLUTION INTRAMUSCULAR AS NEEDED
Status: DISCONTINUED | OUTPATIENT
Start: 2021-06-06 | End: 2021-06-08 | Stop reason: HOSPADM

## 2021-06-06 RX ORDER — OXYCODONE HYDROCHLORIDE 5 MG/1
5 TABLET ORAL EVERY 4 HOURS PRN
Status: DISCONTINUED | OUTPATIENT
Start: 2021-06-06 | End: 2021-06-08 | Stop reason: HOSPADM

## 2021-06-06 RX ORDER — OXYTOCIN-SODIUM CHLORIDE 0.9% IV SOLN 30 UNIT/500ML 30-0.9/5 UT/ML-%
85 SOLUTION INTRAVENOUS ONCE
Status: DISCONTINUED | OUTPATIENT
Start: 2021-06-06 | End: 2021-06-06 | Stop reason: SDUPTHER

## 2021-06-06 RX ORDER — ONDANSETRON 2 MG/ML
4 INJECTION INTRAMUSCULAR; INTRAVENOUS ONCE
Status: COMPLETED | OUTPATIENT
Start: 2021-06-06 | End: 2021-06-06

## 2021-06-06 RX ORDER — METHYLERGONOVINE MALEATE 0.2 MG/ML
200 INJECTION INTRAVENOUS AS NEEDED
Status: DISCONTINUED | OUTPATIENT
Start: 2021-06-06 | End: 2021-06-08 | Stop reason: HOSPADM

## 2021-06-06 RX ORDER — DIPHENHYDRAMINE HCL 25 MG
25 CAPSULE ORAL EVERY 4 HOURS PRN
Status: DISCONTINUED | OUTPATIENT
Start: 2021-06-06 | End: 2021-06-08 | Stop reason: HOSPADM

## 2021-06-06 RX ORDER — CARBOPROST TROMETHAMINE 250 UG/ML
250 INJECTION, SOLUTION INTRAMUSCULAR AS NEEDED
Status: DISCONTINUED | OUTPATIENT
Start: 2021-06-06 | End: 2021-06-06 | Stop reason: HOSPADM

## 2021-06-06 RX ORDER — DOCUSATE SODIUM 100 MG/1
100 CAPSULE, LIQUID FILLED ORAL 2 TIMES DAILY PRN
Status: DISCONTINUED | OUTPATIENT
Start: 2021-06-06 | End: 2021-06-08 | Stop reason: HOSPADM

## 2021-06-06 RX ORDER — SODIUM CHLORIDE 0.9 % (FLUSH) 0.9 %
1-10 SYRINGE (ML) INJECTION AS NEEDED
Status: DISCONTINUED | OUTPATIENT
Start: 2021-06-06 | End: 2021-06-06 | Stop reason: HOSPADM

## 2021-06-06 RX ORDER — CARBOPROST TROMETHAMINE 250 UG/ML
250 INJECTION, SOLUTION INTRAMUSCULAR AS NEEDED
Status: DISCONTINUED | OUTPATIENT
Start: 2021-06-06 | End: 2021-06-06 | Stop reason: SDUPTHER

## 2021-06-06 RX ORDER — SODIUM CHLORIDE 0.9 % (FLUSH) 0.9 %
3 SYRINGE (ML) INJECTION EVERY 12 HOURS SCHEDULED
Status: DISCONTINUED | OUTPATIENT
Start: 2021-06-06 | End: 2021-06-08 | Stop reason: HOSPADM

## 2021-06-06 RX ORDER — METHYLERGONOVINE MALEATE 0.2 MG/ML
200 INJECTION INTRAVENOUS ONCE AS NEEDED
Status: DISCONTINUED | OUTPATIENT
Start: 2021-06-06 | End: 2021-06-06 | Stop reason: SDUPTHER

## 2021-06-06 RX ORDER — OXYTOCIN-SODIUM CHLORIDE 0.9% IV SOLN 30 UNIT/500ML 30-0.9/5 UT/ML-%
85 SOLUTION INTRAVENOUS ONCE
Status: DISCONTINUED | OUTPATIENT
Start: 2021-06-06 | End: 2021-06-06 | Stop reason: HOSPADM

## 2021-06-06 RX ORDER — HYDROMORPHONE HYDROCHLORIDE 1 MG/ML
0.5 INJECTION, SOLUTION INTRAMUSCULAR; INTRAVENOUS; SUBCUTANEOUS
Status: DISCONTINUED | OUTPATIENT
Start: 2021-06-06 | End: 2021-06-06 | Stop reason: HOSPADM

## 2021-06-06 RX ORDER — KETOROLAC TROMETHAMINE 30 MG/ML
30 INJECTION, SOLUTION INTRAMUSCULAR; INTRAVENOUS ONCE
Status: COMPLETED | OUTPATIENT
Start: 2021-06-06 | End: 2021-06-06

## 2021-06-06 RX ORDER — OXYTOCIN-SODIUM CHLORIDE 0.9% IV SOLN 30 UNIT/500ML 30-0.9/5 UT/ML-%
650 SOLUTION INTRAVENOUS ONCE
Status: DISCONTINUED | OUTPATIENT
Start: 2021-06-06 | End: 2021-06-06 | Stop reason: HOSPADM

## 2021-06-06 RX ORDER — SODIUM CHLORIDE 0.9 % (FLUSH) 0.9 %
3 SYRINGE (ML) INJECTION EVERY 12 HOURS SCHEDULED
Status: DISCONTINUED | OUTPATIENT
Start: 2021-06-06 | End: 2021-06-06 | Stop reason: HOSPADM

## 2021-06-06 RX ORDER — ACETAMINOPHEN 500 MG
1000 TABLET ORAL EVERY 6 HOURS
Status: COMPLETED | OUTPATIENT
Start: 2021-06-06 | End: 2021-06-07

## 2021-06-06 RX ORDER — OXYCODONE HYDROCHLORIDE 5 MG/1
10 TABLET ORAL EVERY 4 HOURS PRN
Status: DISCONTINUED | OUTPATIENT
Start: 2021-06-06 | End: 2021-06-08 | Stop reason: HOSPADM

## 2021-06-06 RX ORDER — SIMETHICONE 80 MG
80 TABLET,CHEWABLE ORAL 4 TIMES DAILY PRN
Status: DISCONTINUED | OUTPATIENT
Start: 2021-06-06 | End: 2021-06-08 | Stop reason: HOSPADM

## 2021-06-06 RX ORDER — ALUMINA, MAGNESIA, AND SIMETHICONE 2400; 2400; 240 MG/30ML; MG/30ML; MG/30ML
15 SUSPENSION ORAL EVERY 4 HOURS PRN
Status: DISCONTINUED | OUTPATIENT
Start: 2021-06-06 | End: 2021-06-08 | Stop reason: HOSPADM

## 2021-06-06 RX ORDER — OXYTOCIN-SODIUM CHLORIDE 0.9% IV SOLN 30 UNIT/500ML 30-0.9/5 UT/ML-%
650 SOLUTION INTRAVENOUS ONCE
Status: DISCONTINUED | OUTPATIENT
Start: 2021-06-06 | End: 2021-06-06 | Stop reason: SDUPTHER

## 2021-06-06 RX ORDER — MISOPROSTOL 200 UG/1
800 TABLET ORAL AS NEEDED
Status: DISCONTINUED | OUTPATIENT
Start: 2021-06-06 | End: 2021-06-06 | Stop reason: HOSPADM

## 2021-06-06 RX ORDER — ONDANSETRON 4 MG/1
4 TABLET, FILM COATED ORAL EVERY 8 HOURS PRN
Status: DISCONTINUED | OUTPATIENT
Start: 2021-06-06 | End: 2021-06-08 | Stop reason: HOSPADM

## 2021-06-06 RX ADMIN — KETOROLAC TROMETHAMINE 15 MG: 15 INJECTION, SOLUTION INTRAMUSCULAR; INTRAVENOUS at 09:25

## 2021-06-06 RX ADMIN — KETOROLAC TROMETHAMINE 30 MG: 30 INJECTION, SOLUTION INTRAMUSCULAR; INTRAVENOUS at 00:43

## 2021-06-06 RX ADMIN — ONDANSETRON 4 MG: 2 INJECTION INTRAMUSCULAR; INTRAVENOUS at 00:43

## 2021-06-06 RX ADMIN — SIMETHICONE 80 MG: 80 TABLET, CHEWABLE ORAL at 09:25

## 2021-06-06 RX ADMIN — ACETAMINOPHEN 1000 MG: 500 TABLET, FILM COATED ORAL at 06:34

## 2021-06-06 RX ADMIN — KETOROLAC TROMETHAMINE 15 MG: 15 INJECTION, SOLUTION INTRAMUSCULAR; INTRAVENOUS at 15:05

## 2021-06-06 RX ADMIN — PROMETHAZINE HYDROCHLORIDE 25 MG: 25 TABLET ORAL at 02:48

## 2021-06-06 RX ADMIN — DOCUSATE SODIUM 100 MG: 100 CAPSULE, LIQUID FILLED ORAL at 22:18

## 2021-06-06 RX ADMIN — PRENATAL VITAMINS-IRON FUMARATE 27 MG IRON-FOLIC ACID 0.8 MG TABLET 1 TABLET: at 09:25

## 2021-06-06 RX ADMIN — Medication: at 02:09

## 2021-06-06 RX ADMIN — ACETAMINOPHEN 1000 MG: 500 TABLET, FILM COATED ORAL at 12:29

## 2021-06-06 RX ADMIN — DOCUSATE SODIUM 100 MG: 100 CAPSULE, LIQUID FILLED ORAL at 09:25

## 2021-06-06 RX ADMIN — ACETAMINOPHEN 1000 MG: 500 TABLET, FILM COATED ORAL at 18:25

## 2021-06-06 RX ADMIN — KETOROLAC TROMETHAMINE 15 MG: 15 INJECTION, SOLUTION INTRAMUSCULAR; INTRAVENOUS at 22:18

## 2021-06-06 RX ADMIN — SIMETHICONE 80 MG: 80 TABLET, CHEWABLE ORAL at 22:18

## 2021-06-06 RX ADMIN — ACETAMINOPHEN 1000 MG: 500 TABLET, FILM COATED ORAL at 00:43

## 2021-06-06 NOTE — LACTATION NOTE
06/06/21 1220   Maternal Information   Date of Referral 06/06/21   Person Making Referral other (see comments)  (courtesy)   Maternal Infant Feeding   Maternal Emotional State receptive;relaxed   Milk Expression/Equipment   Breast Pump Type double electric, personal     Mom reports baby is nursing well so far. Baby latched well to right breast in cradle hold. Enc to feed in skin to skin with all feeds, feed on demand, and wake if needed every 2-3 hrs. Teaching done.

## 2021-06-06 NOTE — ANESTHESIA PROCEDURE NOTES
Spinal Block      Patient reassessed immediately prior to procedure    Patient location during procedure: OR  Start Time: 6/5/2021 10:53 PM  Stop Time: 6/5/2021 10:54 PM  Preanesthetic Checklist  Completed: patient identified, IV checked, site marked, risks and benefits discussed, surgical consent, monitors and equipment checked, pre-op evaluation and timeout performed  Spinal Block Prep:  Patient Position:sitting  Sterile Tech:cap, gloves, sterile barriers and mask  Prep:Betadine  Patient Monitoring:EKG, continuous pulse oximetry and blood pressure monitoring  Spinal Block Procedure  Approach:midline  Guidance:palpation technique  Location:L2-L3  Needle Gauge:25 G  Placement of Spinal needle event:cerebrospinal fluid aspirated  Paresthesia: no  Fluid Appearance:clear  Medications: bupivacaine in dextrose (MARCAINE SPINAL / Hyberbaric) 0.75-8.25 % injection, 1.6 mL  morphine PF (ASTRAMORPH) injection, 0.15 mg  fentaNYL citrate (PF) (SUBLIMAZE) injection, 15 mcg  Med Administered at 6/5/2021 10:54 PM   Post Assessment  Patient Tolerance:patient tolerated the procedure well with no apparent complications  Complications no

## 2021-06-06 NOTE — OP NOTE
[facility]   Section Operative Note    Pre-Operative Dx:   1.  IUP at 39w0d  weeks     2. Non-reassuring fetal status        Postoperative dx:    1.  Same     Procedure: Procedure(s):   SECTION PRIMARY   Surgeon: Kavitha Paniagua MD    Assistant: Surgeon(s):  Kavitha Paniagua MD Bradley, Barrett B, MD        Anesthesia:     EBL:   mls.  500 mls.         IV Fluids: 1000 mls.   UOP: 400 mls.       Antibiotics: cefazolin (Ancef)     Infant:            Gender: male  infant    Weight: 3120 g (6 lb 14.1 oz)     Apgars: 8  @ 1 minute /     9  @ 5 minutes    Fetal presentation: cephalic   Amniotic fluid: Clear      Complications:   None      Disposition:   Mother to Mother Baby/Postpartum  in stable condition currently.   Baby to NBN  in stable condition currently.       Procedure:   The patient was taken to the operating room where spinal anesthesia was placed, and she was placed in supine position with a leftward tilt. Sequential compression devices on bilateral lower extremities and a Ariza catheter placed in her bladder. After being prepped and draped in a normal sterile fashion, a Pfannenstiel skin incision was made with a scalpel and taken down to the level of the fascia using the Bovie. The fascia was incised in the midline and extended laterally. The superior edge of the fascia was grasped with Kocher clamps, elevated and the rectus muscle dissected off. In a similar fashion, the inferior edge of the fascia was grasped with Kocher clamps, elevated and the rectus muscles dissected off. The rectus muscles were bisected in the midline. The peritoneum was identified, grasped and entered into sharply using Metzenbaum scissors. This incision was extended superiorly and inferiorly with good visualization of the bladder. Bladder blade was placed. A bladder flap was created. A low transverse uterine incision was made with a scalpel and extended laterally. Amniotomy with clear fluid occurred at  the time of hysterotomy. The head was brought to the uterine incision, and using fundal pressure, the head delivered without complication. Nose and mouth were bulb suctioned.  Shoulders and body were to follow. Cord was milked x4, clamped x2 and cut. Infant was handed to the awaiting pediatric team.  Cord blood was obtained. The placenta was manually extracted. The uterus was exteriorized and cleared of all clot and debris and the hysterotomy site was closed with a 1-0 chromic in a running locked fashion starting at the left angle and moving towards the right. Copious irrigation behind the uterus ensued. The uterus was returned to the abdominal cavity. Paracolic gutters were cleared of all clot and debris. The hysterotomy site was noted to be hemostatic as well as the bladder flap and Interceed was placed over this. The peritoneum was reapproximated using a 2-0 chromic in a running fashion starting superiorly and moving inferiorly. Irrigation over the rectus muscles then occurred with Bovie cauterization of any bleeding sites. The fascia was reapproximated using a #1 Vicryl in a running fashion starting at the left angle and moving towards the right.  The subcutaneous fat layer was hemostatic and closed in an interrupted fashion with 2-0 Plain.  The skin was reapproximated with in sorb sutures. All sponge, lap, and needle counts were correct x2. The patient was taken to the recovery room in stable condition.     Assist: Dr. BRIGITTE Haas was necessary for suction, retraction, aid in delivery of the baby.        Kavitha Paniagua MD  6/5/2021  23:43 EDT

## 2021-06-06 NOTE — PROGRESS NOTES
2021    Name:Alyse Bojorquez    MR#:[id number]     PROGRESS NOTE:  Post-Op 1 S/P    HD:1    Subjective   30 y.o. yo Female  s/p CS at 39w0d doing well. Pain well controlled. Tolerating regular diet and having flatus. Lochia normal.     Patient Active Problem List   Diagnosis   • Pregnancy   • External hemorrhoids   • Positive GBS test   • Delivery of pregnancy by  section        Objective    Vitals  Temp:  Temp:  [97.6 °F (36.4 °C)-99 °F (37.2 °C)] 98.4 °F (36.9 °C)  Temp src: Oral  BP:  BP: ()/(60-88) 123/72  Pulse:  Heart Rate:  [] 77  RR:   Resp:  [14-20] 16    General Awake, alert, no distress  Abdomen Soft, non-distended, fundus firm, below umbilicus, appropriately tender  Incision  Intact, no erythema or exudate  Extremities Calves NT bilaterally     I/O last 3 completed shifts:  In: 1200 [I.V.:1000; IV Piggyback:200]  Out: 3500 [Urine:2250; Blood:1250]    LABS:   Lab Results   Component Value Date    WBC 17.12 (H) 2021    HGB 12.0 2021    HCT 36.6 2021    MCV 83.4 2021     2021       Infant: male       Assessment   1.  POD 1    Plan: Doing well.  Routine postoperative care      Active Problems:   None      Kavitha Paniagua MD  2021 14:43 EDT

## 2021-06-06 NOTE — H&P
History and Physical:    Subjective     Chief Complaint   Patient presents with   • Scheduled Induction       Alyse Bojorquez is a 30 y.o. year old  with an Estimated Date of Delivery: 21 currently at 39w0d presenting with Elective induction of labor.  Patient was started on 25 mcg of Cytotec along with a Ariza bulb.  Fetus had a prolonged bradycardic episode lasting at least 7 minutes followed by several deep variables.  Ariza bulb was removed.  Baby continued to have variable decelerations with each contraction.  Discussed with patient and her  that she is remote from delivery and baby is not tolerating contractions.  Patient consented for a  section    Prenatal care has been with Kavitha Paniagua MD.  It has been significant for GBS carrier and No Complications.        Review of Systems  Pertinent items are noted in HPI.     Past Medical History:   Diagnosis Date   • Anal fissure    • Internal hemorrhoid    • Papanicolaou smear 2018    WNL     Past Surgical History:   Procedure Laterality Date   • OTHER SURGICAL HISTORY      Hemorrhoid surgery   • SIGMOIDOSCOPY     • TOOTH EXTRACTION     • WISDOM TOOTH EXTRACTION       Family History   Problem Relation Age of Onset   • Heart disease Other    • Stroke Other      Social History     Tobacco Use   • Smoking status: Never Smoker   • Smokeless tobacco: Never Used   Substance Use Topics   • Alcohol use: Not Currently     Comment: OCCASIONAL   • Drug use: No     Medications Prior to Admission   Medication Sig Dispense Refill Last Dose   • Inulin (FIBER CHOICE PO) Take  by mouth.   2021 at Unknown time   • Polyethylene Glycol 3350 (MIRALAX PO) Take  by mouth.   2021 at Unknown time   • prenatal vitamin (prenatal, CLASSIC, vitamin) tablet Take  by mouth Daily.   2021 at Unknown time   • polycarbophil (calcium polycarbophil) 625 MG tablet tablet Take  by mouth Daily.        Allergies:  Amoxicillin, Lidocaine, and  "Penicillins  OB History    Para Term  AB Living   1 0 0 0 0 0   SAB TAB Ectopic Molar Multiple Live Births   0 0 0 0 0 0      # Outcome Date GA Lbr Jose/2nd Weight Sex Delivery Anes PTL Lv   1 Current                  Objective     /81   Pulse 77   Temp 99 °F (37.2 °C) (Oral)   Resp 20   Ht 175.3 cm (69\")   Wt 73.5 kg (162 lb)   LMP 2020   SpO2 98%   Breastfeeding Yes   BMI 23.92 kg/m²     Physical Exam    General:  No acute distress           Abdomen: Gravid, nontender       FHT's:  Noted fetal heart tones in the 150s with repetitive variable decelerations.  Positive accelerations at times as well.   Cervix:  Deferred   [place]: Contraction are every 5     Lab Review   External Prenatal Results     Pregnancy Outside Results - Transcribed From Office Records - See Scanned Records For Details     Test Value Date Time    ABO  O  21 1742    Rh  Positive  21 1742    Antibody Screen  Negative  21 1742       Negative  21 0907       Negative  20 1010    Varicella IgG       Rubella  10.60 index 20 1010    Hgb  13.4 g/dL 21 1742       12.4 g/dL 21 0907       13.2 g/dL 20 1010    Hct  39.5 % 21 1742       36.9 % 21 0907       39.5 % 20 1010    Glucose Fasting GTT       Glucose Tolerance Test 1 hour       Glucose Tolerance Test 3 hour       Gonorrhea (discrete)  Negative  20 1010    Chlamydia (discrete)  Negative  20 1010    RPR  Non Reactive  20 1010    VDRL       Syphilis Antibody       HBsAg  Negative  20 1010    Herpes Simplex Virus PCR       Herpes Simplex VIrus Culture       HIV  Non Reactive  20 1010    Hep C RNA Quant PCR       Hep C Antibody  <0.1 s/co ratio 20 1010    AFP       Group B Strep  Streptococcus agalactiae (Group B)  21 1706    GBS Susceptibility to Clindamycin       GBS Susceptibility to Erythromycin       Fetal Fibronectin       Genetic Testing, Maternal Blood   "           Drug Screening     Test Value Date Time    Urine Drug Screen       Amphetamine Screen  Negative  21 1743    Barbiturate Screen  Negative  21 1743    Benzodiazepine Screen  Negative  21 1743    Methadone Screen  Negative  21 1743    Phencyclidine Screen  Negative  21 1743    Opiates Screen  Negative  21 1743    THC Screen  Negative  21 1743    Cocaine Screen       Propoxyphene Screen  Negative  21 1743    Buprenorphine Screen  Negative  21 1743    Methamphetamine Screen       Oxycodone Screen  Negative  21 1743    Tricyclic Antidepressants Screen  Negative  21 1743          Legend    ^: Historical                        Patient Active Problem List    Diagnosis Date Noted   • Positive GBS test 2021     Note Last Updated: 2021     Penicillin allergic.  Will run sensitivities-assistant to clindamycin.  We will treat with vancomycin.     • Pregnancy 2020     Note Last Updated: 2021     BOY!    IOL  @ 9PM     • External hemorrhoids 2020     Note Last Updated: 2020     Followed by Dr. Bowden.           Assessment/Plan     ASSESSMENT  1. IUP at 39w0d  2. Nonreassuring fetal wellbeing   3. GBBSpositive    PLAN  1. Admit to labor and delivery   2.  Proceed with  delivery secondary to nonreassuring fetal well being an intolerance to induction.         Kavitha Paniagua MD  2021@

## 2021-06-06 NOTE — ANESTHESIA POSTPROCEDURE EVALUATION
Patient: Alyse Bojorquez    Procedure Summary     Date: 21 Room / Location: [facility] LABOR DELIVERY 2 /  [facility]    Anesthesia Start:  Anesthesia Stop:     Procedure:  SECTION PRIMARY (N/A Abdomen) Diagnosis:     Surgeons: Kavitha Paniagua MD Provider: Chucho Patel MD    Anesthesia Type: spinal ASA Status: 2 - Emergent          Anesthesia Type: spinal    Vitals  Vitals Value Taken Time   /58 21 1600   Temp 97.8 °F (36.6 °C) 21 1600   Pulse 70 21 1600   Resp 18 21 1600   SpO2 98 % 21 0032   Vitals shown include unvalidated device data.        Post Anesthesia Care and Evaluation    Patient location during evaluation: bedside  Patient participation: complete - patient participated  Level of consciousness: awake  Pain score: 4  Pain management: satisfactory to patient  Airway patency: patent  Anesthetic complications: No anesthetic complications  PONV Status: none  Cardiovascular status: acceptable and hemodynamically stable  Respiratory status: acceptable  Hydration status: acceptable  Post Neuraxial Block status: Motor and sensory function returned to baseline and No signs or symptoms of PDPH

## 2021-06-06 NOTE — ANESTHESIA POSTPROCEDURE EVALUATION
Patient: Alyse Bojorquez    Procedure Summary     Date: 21 Room / Location: [facility] LABOR DELIVERY 2 /  [facility]    Anesthesia Start:  Anesthesia Stop:     Procedure:  SECTION PRIMARY (N/A Abdomen) Diagnosis:     Surgeons: Kavitha Paniagua MD Provider:     Anesthesia Type: spinal ASA Status: 2 - Emergent          Anesthesia Type: spinal    Vitals  Vitals Value Taken Time   BP     Temp     Pulse     Resp     SpO2 100 % 21 2330   Vitals shown include unvalidated device data.        Post Anesthesia Care and Evaluation    Patient location during evaluation: bedside  Patient participation: complete - patient participated  Level of consciousness: awake and alert  Pain score: 0  Pain management: adequate  Airway patency: patent  Anesthetic complications: No anesthetic complications    Cardiovascular status: acceptable  Respiratory status: acceptable  Hydration status: acceptable

## 2021-06-07 PROCEDURE — 25010000002 KETOROLAC TROMETHAMINE PER 15 MG: Performed by: OBSTETRICS & GYNECOLOGY

## 2021-06-07 RX ADMIN — ACETAMINOPHEN 1000 MG: 500 TABLET, FILM COATED ORAL at 01:02

## 2021-06-07 RX ADMIN — DOCUSATE SODIUM 100 MG: 100 CAPSULE, LIQUID FILLED ORAL at 09:07

## 2021-06-07 RX ADMIN — PRENATAL VITAMINS-IRON FUMARATE 27 MG IRON-FOLIC ACID 0.8 MG TABLET 1 TABLET: at 09:07

## 2021-06-07 RX ADMIN — KETOROLAC TROMETHAMINE 15 MG: 15 INJECTION, SOLUTION INTRAMUSCULAR; INTRAVENOUS at 04:02

## 2021-06-07 RX ADMIN — ACETAMINOPHEN 650 MG: 325 TABLET ORAL at 12:41

## 2021-06-07 RX ADMIN — SIMETHICONE 80 MG: 80 TABLET, CHEWABLE ORAL at 09:07

## 2021-06-07 RX ADMIN — ACETAMINOPHEN 650 MG: 325 TABLET ORAL at 06:53

## 2021-06-07 RX ADMIN — ACETAMINOPHEN 650 MG: 325 TABLET ORAL at 19:32

## 2021-06-07 RX ADMIN — DOCUSATE SODIUM 100 MG: 100 CAPSULE, LIQUID FILLED ORAL at 21:24

## 2021-06-07 RX ADMIN — IBUPROFEN 600 MG: 600 TABLET, FILM COATED ORAL at 16:55

## 2021-06-07 RX ADMIN — IBUPROFEN 600 MG: 600 TABLET, FILM COATED ORAL at 09:07

## 2021-06-07 RX ADMIN — IBUPROFEN 600 MG: 600 TABLET, FILM COATED ORAL at 21:24

## 2021-06-07 NOTE — LACTATION NOTE
Nurse states baby has been spitting up colostrum.  Mom reports baby is latching and nursing well on her left breast but more reluctant to nurse on right breast.  Spectra pump demonstrated and encouraged mom to pump right breast if baby does not latch/nurse well on that side.

## 2021-06-07 NOTE — PROGRESS NOTES
2021    Name:Alyse Bojorquez    MR#:[id number]     PROGRESS NOTE:  Post-Op 2 S/P    HD:2    Subjective   30 y.o. yo Female  s/p CS at 39w0d doing well. Pain well controlled. Tolerating regular diet and having flatus. Lochia normal.     Patient Active Problem List   Diagnosis   • Pregnancy   • External hemorrhoids   • Positive GBS test   • Delivery of pregnancy by  section        Objective    Vitals  Temp:  Temp:  [97.8 °F (36.6 °C)-99 °F (37.2 °C)] 98 °F (36.7 °C)  Temp src: Oral  BP:  BP: (110-134)/(58-79) 115/73  Pulse:  Heart Rate:  [70-87] 87  RR:   Resp:  [16-18] 16    General Awake, alert, no distress  Abdomen Soft, non-distended, fundus firm, below umbilicus, appropriately tender  Incision  Intact, no erythema or exudate  Extremities Calves NT bilaterally     I/O last 3 completed shifts:  In: 1000 [I.V.:1000]  Out: 7075 [Urine:5825; Blood:1250]    LABS:   Lab Results   Component Value Date    WBC 17.12 (H) 2021    HGB 12.0 2021    HCT 36.6 2021    MCV 83.4 2021     2021       Infant: male       Assessment   1.  POD 2    Plan: Doing well.  Routine postoperative care      Active Problems:   None      Kavitha Paniagua MD  2021 08:02 EDT

## 2021-06-08 VITALS
HEIGHT: 69 IN | BODY MASS INDEX: 23.99 KG/M2 | SYSTOLIC BLOOD PRESSURE: 116 MMHG | DIASTOLIC BLOOD PRESSURE: 67 MMHG | WEIGHT: 162 LBS | HEART RATE: 73 BPM | RESPIRATION RATE: 18 BRPM | TEMPERATURE: 98.4 F | OXYGEN SATURATION: 98 %

## 2021-06-08 PROCEDURE — 0503F POSTPARTUM CARE VISIT: CPT | Performed by: OBSTETRICS & GYNECOLOGY

## 2021-06-08 RX ADMIN — IBUPROFEN 600 MG: 600 TABLET, FILM COATED ORAL at 15:53

## 2021-06-08 RX ADMIN — DOCUSATE SODIUM 100 MG: 100 CAPSULE, LIQUID FILLED ORAL at 09:46

## 2021-06-08 RX ADMIN — IBUPROFEN 600 MG: 600 TABLET, FILM COATED ORAL at 04:03

## 2021-06-08 RX ADMIN — ACETAMINOPHEN 650 MG: 325 TABLET ORAL at 00:36

## 2021-06-08 RX ADMIN — IBUPROFEN 600 MG: 600 TABLET, FILM COATED ORAL at 09:46

## 2021-06-08 RX ADMIN — ACETAMINOPHEN 650 MG: 325 TABLET ORAL at 12:56

## 2021-06-08 RX ADMIN — ACETAMINOPHEN 650 MG: 325 TABLET ORAL at 06:33

## 2021-06-08 RX ADMIN — PRENATAL VITAMINS-IRON FUMARATE 27 MG IRON-FOLIC ACID 0.8 MG TABLET 1 TABLET: at 09:46

## 2021-06-08 NOTE — LACTATION NOTE
06/08/21 1200   Maternal Information   Person Making Referral   (fu consult)   Maternal Reason for Referral   (mom states milk coming in--baby fed better last night)   Infant Reason for Referral   (baby has lost 9.46% from birth weight)   Maternal Assessment   Breast Size Issue none   Breast Shape Bilateral:;round   Breast Density Bilateral:;full   Nipples Bilateral:;short   Left Nipple Symptoms intact   Right Nipple Symptoms intact   Maternal Infant Feeding   Maternal Emotional State receptive;tense   Infant Positioning cross-cradle   Signs of Milk Transfer deep jaw excursions noted  (breast softened some with feeding)   Pain with Feeding no   Comfort Measures Before/During Feeding infant position adjusted;latch adjusted;maternal position adjusted  (small nipple shield)   Latch Assistance minimal assistance   Support Person Involvement actively supporting mother   Milk Expression/Equipment   Breast Pump Type double electric, personal  (reviewed pump use--spectra S1)   Breast Pump Flange Type hard   Breast Pump Flange Size 24 mm   Breast Pumping   Breast Pumping Interventions   (pump if full after feedings or not feeding & breast firm)   Helped with latch and position and mom will work on these. Reviewed pump & encouraged to pump, if needed. Can give baby any pumped milk. Demonstrated massage and compression to use to help with milk expression while milk is coming in. Teaching done as documented under Education. To call lactation services, if there are questions or concerns or if mom wants an outpatient clinic appt.

## 2021-06-08 NOTE — DISCHARGE SUMMARY
Discharge Summary    Date of Admission: 2021  Date of Discharge:  2021      Patient: Alyse Bojorquez      MR#:[id number]    Primary Surgeon/OB: Kavitha Paniagua MD    Discharge Surgeon/OB: Kavitha Paniagua MD    Presenting Problem/History of Present Illness  Pregnancy [Z34.90]     Patient Active Problem List   Diagnosis   • Pregnancy   • External hemorrhoids   • Positive GBS test   • Delivery of pregnancy by  section         Discharge Diagnosis:  section at 39w0d    Procedures:  , Low Transverse     2021    11:04 PM        Discharge Date: 2021; Discharge Time: 07:47 EDT    Hospital Course  Patient is a 30 y.o. female  at 39w0d status post  section with uneventful postoperative recovery.  Patient was advanced to regular diet on postoperative day#1.  On discharge, ambulating, tolerating a regular diet without any difficulties and her incision is dry, clean and intact.     Infant:   male  fetus 3120 g (6 lb 14.1 oz)  with Apgar scores of 8 , 9  at five minutes.    Condition on Discharge:  Stable    Vital Signs  Temp:  [98.3 °F (36.8 °C)-99.5 °F (37.5 °C)] 98.5 °F (36.9 °C)  Heart Rate:  [75-81] 75  Resp:  [16] 16  BP: (114-124)/(65-78) 114/70    Lab Results   Component Value Date    WBC 17.12 (H) 2021    HGB 12.0 2021    HCT 36.6 2021    MCV 83.4 2021     2021       Discharge Disposition  Home or Self Care    Discharge Medications     Discharge Medications      Continue These Medications      Instructions Start Date   FIBER CHOICE PO   Oral      MIRALAX PO   Oral      polycarbophil 625 MG tablet tablet   Oral, Daily      prenatal (CLASSIC) vitamin  tablet  Generic drug: prenatal vitamin   Oral, Daily             Discharge Diet:     Activity at Discharge:   Activity Instructions     Activity as Tolerated      Other Instructions (Specify)      No driving for 2 weeks, No lifting over 10lbs for 6 weeks.    Pelvic Rest       Pelvic rest including no tampons, no tub baths, and no intercourse until 6 weeks/cleared by Provider.          Follow-up Appointments  No future appointments.  Additional Instructions for the Follow-ups that You Need to Schedule     Call MD for problems / concerns.   As directed      Please call with concerns of depression.    Order Comments: Please call with concerns of depression.          Discharge Follow-up with Specialty: Nurse practitioner and Dr. Paniagua's office; 2 Weeks   As directed      Specialty: Nurse practitioner and Dr. Paniagua's office    Follow Up: 2 Weeks    Follow Up Details: Incision check               Kavitha Paniagua MD  06/08/21  07:47 EDT  Csd

## 2021-06-18 ENCOUNTER — POSTPARTUM VISIT (OUTPATIENT)
Dept: OBSTETRICS AND GYNECOLOGY | Facility: CLINIC | Age: 31
End: 2021-06-18

## 2021-06-18 VITALS
BODY MASS INDEX: 21.92 KG/M2 | HEIGHT: 69 IN | SYSTOLIC BLOOD PRESSURE: 116 MMHG | WEIGHT: 148 LBS | DIASTOLIC BLOOD PRESSURE: 70 MMHG

## 2021-06-18 PROCEDURE — 0503F POSTPARTUM CARE VISIT: CPT | Performed by: NURSE PRACTITIONER

## 2021-06-18 NOTE — PROGRESS NOTES
"Chief Complaint   Patient presents with   • Postpartum Care       2 Week Postpartum Visit         Alyse Bojorquez is a 30 y.o.  s/p Csection at 39 weeks on 2021, who presents today for a 2 week postpartum check.      Patient reports her incision is clean, dry, intact. Patient denies postpartum depression.  Patient describes bleeding as absent.  Patient is breast and bottle feeding.  Desires contraceptive methods: discuss with provider at 6 wk appt  for contraception.  Patient reports bowel or bladder issues.    Postpartum Depression Screening Questionnaire: 0, 0 treatment indicated.  Baby Name: Will   Baby weight: 6lb 14oz   Baby Discharged with Mom      The additional following portions of the patient's history were reviewed and updated as appropriate: allergies, current medications, past family history, past medical history, past social history, past surgical history and problem list.      Review of Systems   Constitutional: Negative.    Respiratory: Negative.    Cardiovascular: Negative.    Gastrointestinal: Negative.    Genitourinary: Negative.    Psychiatric/Behavioral: Negative.        I have reviewed and agree with the HPI, ROS, and historical information as entered above. Cherise Turpin, APRN    Objective   /70   Ht 175.3 cm (69\")   Wt 67.1 kg (148 lb)   LMP 2020   Breastfeeding Yes   BMI 21.86 kg/m²     Physical Exam  Constitutional:       Appearance: Normal appearance.   Pulmonary:      Effort: Pulmonary effort is normal.   Abdominal:      Palpations: Abdomen is soft.      Comments: incision well-healed   Neurological:      Mental Status: She is alert.              Assessment and Plan    Problem List Items Addressed This Visit     None      Visit Diagnoses     Encounter for postpartum visit    -  Primary          1. S/p , 2 weeks postpartum.  Doing well.    2. Continued pelvic rest with a return to driving and light physical activity.  3. Contraception: " contraceptive methods: undecided  Return in about 4 weeks (around 7/16/2021) for JNA 6 week check.    Cherise Turpin, APRN  06/18/2021

## 2021-06-30 ENCOUNTER — TELEPHONE (OUTPATIENT)
Dept: OBSTETRICS AND GYNECOLOGY | Facility: CLINIC | Age: 31
End: 2021-06-30

## 2021-07-19 ENCOUNTER — POSTPARTUM VISIT (OUTPATIENT)
Dept: OBSTETRICS AND GYNECOLOGY | Facility: CLINIC | Age: 31
End: 2021-07-19

## 2021-07-19 VITALS — BODY MASS INDEX: 20.67 KG/M2 | SYSTOLIC BLOOD PRESSURE: 102 MMHG | DIASTOLIC BLOOD PRESSURE: 66 MMHG | WEIGHT: 140 LBS

## 2021-07-19 PROBLEM — Z34.90 PREGNANCY: Status: RESOLVED | Noted: 2020-11-02 | Resolved: 2021-07-19

## 2021-07-19 PROBLEM — B95.1 POSITIVE GBS TEST: Status: RESOLVED | Noted: 2021-05-21 | Resolved: 2021-07-19

## 2021-07-19 PROCEDURE — 0503F POSTPARTUM CARE VISIT: CPT | Performed by: OBSTETRICS & GYNECOLOGY

## 2021-07-19 NOTE — PROGRESS NOTES
Chief Complaint   Patient presents with   • Postpartum Care       6 Week Postpartum Visit         Alyse Bojorquez is a 30 y.o.  s/p , due to NRFHT at 39 weeks on 21, who presents today for a 6 week postpartum check.      At the time of delivery were you diagnosed with any of the following: None. The incision is healing well    Patient denies postpartum depression.  Patient describes bleeding as light.  Patient is breast and bottle feeding.  Desires contraceptive methods: Condoms for contraception.  Patient denies bowel or bladder issues.    Postpartum Depression Screening Questionnaire: 6, no treatment indicated.  Baby Name: Will  Baby Weight: 6#14oz  Baby Discharged: Discharged with Mom  Delivering Physician: JAIMEE/MARJAN    Last Completed Pap Smear          Ordered - PAP SMEAR (Every 3 Years) Ordered on 2020  Done - normal in kg with [facility]              Is the patient due for a pap today? Yes.  Performed Today    The additional following portions of the patient's history were reviewed and updated as appropriate: allergies, current medications, past family history, past medical history, past social history, past surgical history and problem list.    Review of Systems   Constitutional: Negative.    HENT: Negative.    Eyes: Negative.    Respiratory: Negative.    Cardiovascular: Negative.    Gastrointestinal: Negative.    Endocrine: Negative.    Genitourinary: Negative.    Musculoskeletal: Negative.    Skin: Negative.    Allergic/Immunologic: Negative.    Neurological: Negative.    Hematological: Negative.    Psychiatric/Behavioral: Negative.      All other systems reviewed and are negative.     I have reviewed and agree with the HPI, ROS, and historical information as entered above. Kavitha Paniagua MD    /66   Wt 63.5 kg (140 lb)   LMP 2020   BMI 20.67 kg/m²     Physical Exam  Vitals and nursing note reviewed. Exam conducted with a chaperone present.    Constitutional:       Appearance: She is well-developed.   HENT:      Head: Normocephalic and atraumatic.   Pulmonary:      Effort: Pulmonary effort is normal.   Abdominal:      General: A surgical scar is present.      Palpations: Abdomen is soft. Abdomen is not rigid.      Comments: Clean, Dry, and Intact.  No erythema.    Genitourinary:     Vagina: No lesions or prolapsed vaginal walls.      Cervix: No lesion or erythema.      Uterus: Enlarged. Not tender and no uterine prolapse.       Adnexa:         Right: No mass, tenderness or fullness.          Left: No mass, tenderness or fullness.     Musculoskeletal:      Cervical back: Normal range of motion.   Neurological:      Mental Status: She is alert and oriented to person, place, and time.   Psychiatric:         Mood and Affect: Mood normal.         Behavior: Behavior normal.             Assessment and Plan    Problem List Items Addressed This Visit        Gravid and     Delivery of pregnancy by  section    Overview     [id number] at 39 weeks for nonreassuring fetal wellbeing baby boy weighing 6 pounds 14 ounces named will           Other Visit Diagnoses     Postpartum follow-up    -  Primary    Relevant Orders    Pap IG, Rfx HPV ASCU          1. S/p , 6 weeks postpartum.  Doing well.    2. Return to normal physical activity.  No pelvic restrictions.   3. Contraception: contraceptive methods: Condoms  4. Follow up for Annual.     Kavitha Paniagua MD  2021

## 2021-12-03 ENCOUNTER — TRANSCRIBE ORDERS (OUTPATIENT)
Dept: ADMINISTRATIVE | Facility: HOSPITAL | Age: 31
End: 2021-12-03

## 2021-12-03 ENCOUNTER — HOSPITAL ENCOUNTER (OUTPATIENT)
Dept: GENERAL RADIOLOGY | Facility: HOSPITAL | Age: 31
Discharge: HOME | End: 2021-12-03
Admitting: STUDENT IN AN ORGANIZED HEALTH CARE EDUCATION/TRAINING PROGRAM

## 2021-12-03 DIAGNOSIS — M54.9 ACUTE BACK PAIN, UNSPECIFIED BACK LOCATION, UNSPECIFIED BACK PAIN LATERALITY: Primary | ICD-10-CM

## 2021-12-03 PROCEDURE — 72072 X-RAY EXAM THORAC SPINE 3VWS: CPT

## 2021-12-03 PROCEDURE — 72114 X-RAY EXAM L-S SPINE BENDING: CPT

## 2021-12-28 ENCOUNTER — TELEPHONE (OUTPATIENT)
Dept: OBSTETRICS AND GYNECOLOGY | Facility: CLINIC | Age: 31
End: 2021-12-28

## 2021-12-28 RX ORDER — DROSPIRENONE AND ETHINYL ESTRADIOL 0.02-3(28)
1 KIT ORAL DAILY
Qty: 1 TABLET | Refills: 6 | Status: SHIPPED | OUTPATIENT
Start: 2021-12-28 | End: 2022-07-28 | Stop reason: SDUPTHER

## 2021-12-28 NOTE — TELEPHONE ENCOUNTER
----- Message from Alyse Bojorquez sent at 12/28/2021  1:19 PM EST -----  Regarding: Coleen refill  Dr. Paniagua,  I recently stopped breastfeeding and would like to go back on the birth control I was taking before I got pregnant. I think it was generic for Harper (Coleen). Can I get this refilled please? Also remind me on how to start it, do I need to wait after until my first period?   Thanks,  Alyse

## 2021-12-28 NOTE — TELEPHONE ENCOUNTER
[facility] approved until time of her annual exam in July.  She was prescribed Coleen  prior to pregnancy by Dr. Paniagua.

## 2021-12-30 ENCOUNTER — TELEPHONE (OUTPATIENT)
Dept: OBSTETRICS AND GYNECOLOGY | Facility: CLINIC | Age: 31
End: 2021-12-30

## 2021-12-30 NOTE — TELEPHONE ENCOUNTER
"Spoke to the pharmacy and corrected order. It should be one tablet daily, but the dose/quantity should be  \"pack\".   "

## 2021-12-30 NOTE — TELEPHONE ENCOUNTER
PATIENT LVM STATING THAT HER PHARMACY MADE HER AWARE THAT HER PRESCRIPTION WAS PRESCRIBED INCORRECTLY, PLEASE ADVISE.

## 2022-07-28 ENCOUNTER — OFFICE VISIT (OUTPATIENT)
Dept: OBSTETRICS AND GYNECOLOGY | Facility: CLINIC | Age: 32
End: 2022-07-28

## 2022-07-28 VITALS
DIASTOLIC BLOOD PRESSURE: 82 MMHG | WEIGHT: 129 LBS | HEIGHT: 69 IN | SYSTOLIC BLOOD PRESSURE: 124 MMHG | BODY MASS INDEX: 19.11 KG/M2

## 2022-07-28 DIAGNOSIS — Z30.41 ENCOUNTER FOR SURVEILLANCE OF CONTRACEPTIVE PILLS: ICD-10-CM

## 2022-07-28 DIAGNOSIS — Z01.419 WOMEN'S ANNUAL ROUTINE GYNECOLOGICAL EXAMINATION: Primary | ICD-10-CM

## 2022-07-28 PROCEDURE — 99395 PREV VISIT EST AGE 18-39: CPT | Performed by: NURSE PRACTITIONER

## 2022-07-28 RX ORDER — DROSPIRENONE AND ETHINYL ESTRADIOL 0.02-3(28)
1 KIT ORAL DAILY
Qty: 84 TABLET | Refills: 4 | Status: SHIPPED | OUTPATIENT
Start: 2022-07-28 | End: 2023-01-03 | Stop reason: SDUPTHER

## 2022-07-28 RX ORDER — NAPROXEN 250 MG/1
250 TABLET ORAL 2 TIMES DAILY PRN
COMMUNITY
End: 2023-01-31

## 2022-07-28 NOTE — PROGRESS NOTES
Gynecologic Annual Exam Note        Gynecologic Exam (Annual )        Subjective     HPI  Alyse Bojorquez is a 31 y.o.  female who presents for annual well woman exam as a established patient. Since her last visit the patient was diagnosed with Ankylosing spondylitis (HCC). Patient reports problems with: spotting between periods with OCP. She states that she just started back on OCP around 5-6 months ago. She reports that period last month was michael, but she did start spotting a little today and she isn't due for period for about two weeks.  Patient's last menstrual period was 2022.. Her periods are regular every 25-35 days, lasting 3 days. The flow is light. Dysmenorrhea:mild, occurring first 1-2 days of flow. . Partner Status: Marital Status: .  She is sexually active. She has not had new partners.. STD testing recommendations have been explained to the patient and she does not desire STD testing.    Additional OB/GYN History   Current contraception: contraceptive methods: OCP (estrogen/progesterone)  Desires to: continue contraception  Thromboembolic Disease: none  Age of menarche: 16    History of STD: no    Last Pap : 2021. Results: negative. HPV: not done  Last Completed Pap Smear          Ordered - PAP SMEAR (Every 3 Years) Ordered on 2021  SCANNED - PAP SMEAR    2020  Done - normal in [place] with [facility]                 History of abnormal Pap smear: no  Gardasil status:completed  Family history of uterine, colon, breast, or ovarian cancer: no  Performs monthly Self-Breast Exam: yes  Exercises Regularly:yes  Feelings of Anxiety or Depression: no  Tobacco Usage?: No       Current Outpatient Medications:   •  drospirenone-ethinyl estradiol (Coleen) 3-0.02 MG per tablet, Take 1 tablet by mouth Daily., Disp: 84 tablet, Rfl: 4  •  naproxen (NAPROSYN) 250 MG tablet, Take 250 mg by mouth 2 (Two) Times a Day As Needed., Disp: , Rfl:      Patient is  "requesting refills of OCP.    OB History        1    Para   1    Term   1       0    AB   0    Living   1       SAB   0    IAB   0    Ectopic   0    Molar   0    Multiple   0    Live Births   1                Health Maintenance   Topic Date Due   • ANNUAL PHYSICAL  Never done   • Annual Gynecologic Pelvic and Breast Exam  2022   • INFLUENZA VACCINE  10/01/2022   • PAP SMEAR  2024   • TDAP/TD VACCINES (2 - Td or Tdap) 2031   • HEPATITIS C SCREENING  Completed   • COVID-19 Vaccine  Completed   • Pneumococcal Vaccine 0-64  Aged Out       Past Medical History:   Diagnosis Date   • Anal fissure    • Ankylosing spondylitis (HCC)    • Internal hemorrhoid    • Papanicolaou smear 2018    WNL        Past Surgical History:   Procedure Laterality Date   •  SECTION N/A 2021    Procedure:  SECTION PRIMARY;  Surgeon: Kavitha Paniagua MD;  Location: [facility] LABOR DELIVERY;  Service: Obstetrics/Gynecology;  Laterality: N/A;   • OTHER SURGICAL HISTORY      Hemorrhoid surgery   • SIGMOIDOSCOPY     • TOOTH EXTRACTION     • WISDOM TOOTH EXTRACTION         The additional following portions of the patient's history were reviewed and updated as appropriate: allergies, current medications, past family history, past medical history, past social history, past surgical history and problem list.    Review of Systems   Constitutional: Negative.    Respiratory: Negative.    Cardiovascular: Negative.    Gastrointestinal: Negative.    Genitourinary: Positive for menstrual problem.   Neurological: Negative.          I have reviewed and agree with the HPI, ROS, and historical information as entered above. Cherise Turpin, APRN        Objective   /82   Ht 175.3 cm (69\")   Wt 58.5 kg (129 lb)   LMP 2022   BMI 19.05 kg/m²     Physical Exam  Vitals and nursing note reviewed. Exam conducted with a chaperone present.   Constitutional:       Appearance: She is well-developed. "   HENT:      Head: Normocephalic and atraumatic.   Neck:      Thyroid: No thyroid mass or thyromegaly.   Cardiovascular:      Heart sounds: No murmur heard.  Pulmonary:      Effort: Pulmonary effort is normal. No retractions.      Breath sounds: No wheezing, rhonchi or rales.   Chest:      Chest wall: No mass or tenderness.   Breasts:      Right: Normal. No mass, nipple discharge, skin change or tenderness.      Left: Normal. No mass, nipple discharge, skin change or tenderness.       Abdominal:      Palpations: Abdomen is soft. Abdomen is not rigid. There is no mass.      Tenderness: There is no abdominal tenderness. There is no guarding.      Hernia: No hernia is present. There is no hernia in the left inguinal area.   Genitourinary:     Labia:         Right: No rash, tenderness or lesion.         Left: No rash, tenderness or lesion.       Vagina: Normal. No vaginal discharge or lesions.      Cervix: Cervical bleeding present. No cervical motion tenderness, discharge or lesion.      Uterus: Normal. Not enlarged, not fixed and not tender.       Adnexa:         Right: No mass or tenderness.          Left: No mass or tenderness.        Rectum: No external hemorrhoid.   Musculoskeletal:      Cervical back: Normal range of motion. No muscular tenderness.   Neurological:      Mental Status: She is alert and oriented to person, place, and time.   Psychiatric:         Behavior: Behavior normal.            Assessment and Plan    Problem List Items Addressed This Visit    None     Visit Diagnoses     Women's annual routine gynecological examination    -  Primary    Relevant Orders    LIQUID-BASED PAP SMEAR, [facility] ([facility])    Encounter for surveillance of contraceptive pills            BTB was mainly during first 3 months after restarting. This time it is occurring because she accidentally took 2 pills in one day. Advised to take pill at same time daily, she will monitor and call if persists.     1. GYN annual well  woman exam.   2. Reviewed pap guidelines.   3. OCP's/Vaginal Ring - Discussed side effects of nausea, BTB, headaches, breast tenderness and slight weight gain in the first three cycles.  Understands risks of blood clots, stroke, and theoretical risk of breast cancer.  Denies family history of blood clots.  4. Reviewed monthly self breast exams.  Instructed to call with lumps, pain, or breast discharge.    Return in about 1 year (around 7/28/2023) for Annual physical.      Cherise Turpin, APRN  07/28/2022

## 2022-08-01 LAB — REF LAB TEST METHOD: NORMAL

## 2023-01-03 RX ORDER — DROSPIRENONE AND ETHINYL ESTRADIOL 0.02-3(28)
1 KIT ORAL DAILY
Qty: 84 TABLET | Refills: 4 | Status: SHIPPED | OUTPATIENT
Start: 2023-01-03

## 2023-01-31 ENCOUNTER — OFFICE VISIT (OUTPATIENT)
Dept: OBSTETRICS AND GYNECOLOGY | Facility: CLINIC | Age: 33
End: 2023-01-31
Payer: COMMERCIAL

## 2023-01-31 VITALS
HEIGHT: 69 IN | WEIGHT: 136 LBS | BODY MASS INDEX: 20.14 KG/M2 | DIASTOLIC BLOOD PRESSURE: 78 MMHG | SYSTOLIC BLOOD PRESSURE: 118 MMHG

## 2023-01-31 DIAGNOSIS — N92.1 BREAKTHROUGH BLEEDING ON OCPS: Primary | ICD-10-CM

## 2023-01-31 PROCEDURE — 99212 OFFICE O/P EST SF 10 MIN: CPT | Performed by: OBSTETRICS & GYNECOLOGY

## 2023-01-31 RX ORDER — PROPRANOLOL HYDROCHLORIDE 10 MG/1
TABLET ORAL
COMMUNITY

## 2023-01-31 RX ORDER — DOXYLAMINE SUCCINATE 25 MG/1
TABLET ORAL
COMMUNITY

## 2023-01-31 RX ORDER — MELOXICAM 7.5 MG/1
TABLET ORAL EVERY 24 HOURS
COMMUNITY

## 2023-01-31 NOTE — PROGRESS NOTES
Chief Complaint   Patient presents with   • Follow-up     Discuss birth control         Subjective   HPI  Alyse Bojorquez is a 32 y.o. female, , LMP was on Patient's last menstrual period was 2023. who presents for problem with current birth control method. She has been on same OCP for over 10 years and has not issues until a year ago. She states that she started back on OCP after she stopped breastfeeding 2021, and reports irregular intermittent spotting since. She states have a regular period, but also has breakthrough spotting.    The patient's current method of contraception is contraceptive methods: Oral progesterone-only contraceptive. She is not satisfied with her current method of birth control due to irregular spotting. She would like to discuss other options for birth control.and make sure there is nothing abnormal is going on since she has never had these issues. Patient is wanting to start planning for pregnancy in around 5 months, so wants to confirm that everything is normal so she can prepare to start trying to conceive. The patient reports additional symptoms as none.     Her periods occur every 25-35 days , lasting 4 days. The flow is light.. She reports dysmenorrhea is mild, occurring first 1-2 days of flow.   Partner Status: Marital Status: . She is sexually active. She has not had new partners.    Additional OB/GYN History   Thromboembolic Disease: none  History of hypertension: no  History of migraines: No  Tobacco Usage?: No   Age of menarche: 15    Last Pap :   Last Completed Pap Smear          PAP SMEAR (Every 3 Years) Next due on 2022  LIQUID-BASED PAP SMEAR, [facility] ([facility])    2021  SCANNED - PAP SMEAR    2020  Done - normal in [place] with [facility]              History of abnormal Pap smear: no      Current Outpatient Medications:   •  doxylamine (Unisom SleepTabs) 25 MG tablet, Unisom (doxylamine) 25 mg tablet  Take by  "oral route., Disp: , Rfl:   •  drospirenone-ethinyl estradiol (Coleen) 3-0.02 MG per tablet, Take 1 tablet by mouth Daily., Disp: 84 tablet, Rfl: 4  •  meloxicam (MOBIC) 7.5 MG tablet, Daily., Disp: , Rfl:   •  propranolol (INDERAL) 10 MG tablet, propranolol 10 mg tablet, Disp: , Rfl:   •  sertraline (ZOLOFT) 50 MG tablet, , Disp: , Rfl:      Past Medical History:   Diagnosis Date   • Anal fissure    • Ankylosing spondylitis (HCC)    • Anxiety    • Depression    • Internal hemorrhoid    • Kidney stone    • Papanicolaou smear 2018    WNL        Past Surgical History:   Procedure Laterality Date   •  SECTION N/A 2021    Procedure:  SECTION PRIMARY;  Surgeon: Kavitha Paniagua MD;  Location: [facility] LABOR DELIVERY;  Service: Obstetrics/Gynecology;  Laterality: N/A;   • OTHER SURGICAL HISTORY      Hemorrhoid surgery   • SIGMOIDOSCOPY     • TOOTH EXTRACTION     • WISDOM TOOTH EXTRACTION         The additional following portions of the patient's history were reviewed and updated as appropriate: allergies, current medications, past family history, past medical history, past social history, past surgical history and problem list.    Review of Systems   Constitutional: Negative.    HENT: Negative.    Eyes: Negative.    Respiratory: Negative.    Cardiovascular: Negative.    Gastrointestinal: Negative.    Endocrine: Negative.    Genitourinary: Positive for menstrual problem.   Musculoskeletal: Negative.    Skin: Negative.    Allergic/Immunologic: Negative.    Neurological: Negative.    Hematological: Negative.    Psychiatric/Behavioral: Negative.        I have reviewed and agree with the HPI, ROS, and historical information as entered above. Kavitha Paniagua MD    Objective   /78   Ht 175.3 cm (69\")   Wt 61.7 kg (136 lb)   LMP 2023   BMI 20.08 kg/m²     Physical Exam  Vitals and nursing note reviewed.   Constitutional:       Appearance: Normal appearance. She is well-developed. "   HENT:      Head: Normocephalic and atraumatic.   Pulmonary:      Effort: Pulmonary effort is normal.      Breath sounds: Normal breath sounds.   Abdominal:      General: There is no distension.      Palpations: Abdomen is soft. Abdomen is not rigid. There is no mass.      Tenderness: There is no abdominal tenderness. There is no guarding or rebound.   Musculoskeletal:      Cervical back: Normal range of motion.   Skin:     General: Skin is warm and dry.   Neurological:      Mental Status: She is alert and oriented to person, place, and time.   Psychiatric:         Mood and Affect: Mood normal.         Behavior: Behavior normal.         Assessment & Plan     Assessment     Problem List Items Addressed This Visit        Genitourinary and Reproductive     Breakthrough bleeding on OCPs - Primary    Overview     Discussed breakthrough bleeding on OCPs.  Patient has noticed that since delivery of her child.  Currently is about every other month for few days.  It appears to be related to her OCPs.  We discussed switching to a higher dose OCP versus coming off OCPs.  Patient considering pregnancy in the next 5 to 6 months.  We discussed the pros and cons of each.  She elects to come off her OCPs.  If breakthrough bleeding continues despite being off OCPs, she will come into the office for blood work and ultrasound.            Follow Up: Return for Annual physical.    Kavitha Paniagua MD  01/31/2023

## 2023-10-16 ENCOUNTER — INITIAL PRENATAL (OUTPATIENT)
Dept: OBSTETRICS AND GYNECOLOGY | Facility: CLINIC | Age: 33
End: 2023-10-16
Payer: COMMERCIAL

## 2023-10-16 VITALS — BODY MASS INDEX: 19.49 KG/M2 | WEIGHT: 132 LBS | DIASTOLIC BLOOD PRESSURE: 70 MMHG | SYSTOLIC BLOOD PRESSURE: 120 MMHG

## 2023-10-16 DIAGNOSIS — Z34.90 PRENATAL CARE, ANTEPARTUM: ICD-10-CM

## 2023-10-16 DIAGNOSIS — Z98.891 HISTORY OF CESAREAN SECTION: Primary | ICD-10-CM

## 2023-10-16 LAB
APPEARANCE UR: ABNORMAL
BACTERIA #/AREA URNS HPF: ABNORMAL /HPF
BILIRUB UR QL STRIP: NEGATIVE
CASTS URNS MICRO: ABNORMAL
COLOR UR: YELLOW
EPI CELLS #/AREA URNS HPF: ABNORMAL /HPF
GLUCOSE UR QL STRIP: NEGATIVE
HGB UR QL STRIP: NEGATIVE
KETONES UR QL STRIP: NEGATIVE
LEUKOCYTE ESTERASE UR QL STRIP: NEGATIVE
NITRITE UR QL STRIP: NEGATIVE
PH UR STRIP: 7 [PH] (ref 5–8)
PROT UR QL STRIP: ABNORMAL
RBC #/AREA URNS HPF: ABNORMAL /HPF
SP GR UR STRIP: 1.02 (ref 1–1.03)
UROBILINOGEN UR STRIP-MCNC: ABNORMAL MG/DL
WBC #/AREA URNS HPF: ABNORMAL /HPF

## 2023-10-16 PROCEDURE — 0501F PRENATAL FLOW SHEET: CPT | Performed by: OBSTETRICS & GYNECOLOGY

## 2023-10-16 NOTE — PROGRESS NOTES
Initial ob visit     CC- Here for care of pregnancy        Alyse Bojorquez is a 33 y.o. female, , who presents for her first obstetrical visit.  Her last LMP was Patient's last menstrual period was 2023.. Her ERNIE is 2024, by Ultrasound. Current GA is 8w1d.     Initial positive test date : 9/15/23, UPT        Her periods are: every 5 weeks  Prior obstetric issues: none  Patient's past medical history is significant for:  history of anal fissure .  Family history of genetic issues (includes FOB): none  Prior infections concerning in pregnancy (Rash, fever in last 2 weeks): No  Varicella Hx - vaccinated  Prior testing for Cystic Fibrosis Carrier or Sickle Cell Trait- never  Prepregnancy BMI - Body mass index is 19.49 kg/m².  History of STD: no  Hx of HSV for patient or partner: no  US done today: Yes.  Findings showed single viable IUP measuring 8w1d.  I have personally evaluated the U/S and agree with the findings. Kavitha Paniagua MD      OB History    Para Term  AB Living   2 1 1 0 0 1   SAB IAB Ectopic Molar Multiple Live Births   0 0 0 0 0 1      # Outcome Date GA Lbr Jose/2nd Weight Sex Delivery Anes PTL Lv   2 Current            1 Term 21 39w0d  3120 g (6 lb 14.1 oz) M CS-LTranv Spinal N DRAGAN       Additional Pertinent History   Last Pap : 22 Result: negative HPV: negative     Last Completed Pap Smear            PAP SMEAR (Every 3 Years) Next due on 2022  LIQUID-BASED PAP SMEAR, [facility] ([facility])    2021  SCANNED - PAP SMEAR    2020  Done - normal in [place] with [facility]                  History of abnormal Pap smear: no  Family history of uterine, colon, breast, or ovarian cancer: no  Feelings of Anxiety or Depression: no  Tobacco Usage?: No   Alcohol/Drug Use?: NO  Over the age of 35 at delivery: no  Desires Genetic Screening: desires  Flu Status: Already given in current flu season    PMH    Current Outpatient  Medications:     doxylamine (Unisom SleepTabs) 25 MG tablet, Unisom (doxylamine) 25 mg tablet  Take by oral route., Disp: , Rfl:     propranolol (INDERAL) 10 MG tablet, As Needed (flights)., Disp: , Rfl:      Past Medical History:   Diagnosis Date    Anal fissure     Ankylosing spondylitis     Anxiety     Depression     Internal hemorrhoid     Kidney stone     Papanicolaou smear 2018    WNL        Past Surgical History:   Procedure Laterality Date     SECTION N/A 2021    Procedure:  SECTION PRIMARY;  Surgeon: Kavitha Paniagua MD;  Location: [facility] LABOR DELIVERY;  Service: Obstetrics/Gynecology;  Laterality: N/A;    OTHER SURGICAL HISTORY      Hemorrhoid surgery    SIGMOIDOSCOPY      TOOTH EXTRACTION      WISDOM TOOTH EXTRACTION         Review of Systems   Review of Systems   Constitutional: Negative.    HENT: Negative.          Dry mouth   Eyes: Negative.    Respiratory: Negative.     Cardiovascular: Negative.    Gastrointestinal:  Positive for nausea.   Endocrine: Negative.    Genitourinary: Negative.    Musculoskeletal: Negative.    Skin: Negative.    Allergic/Immunologic: Negative.    Neurological: Negative.    Hematological: Negative.    Psychiatric/Behavioral: Negative.         Patient Reports: Nausea and Cramping  Patient Denies: Spotting and Heavy bleeding  All systems reviewed and otherwise normal.    I have reviewed and agree with the HPI, ROS, and historical information as entered above. Kavitha Paniagua MD      /70   Wt 59.9 kg (132 lb)   LMP 2023   BMI 19.49 kg/m²     The additional following portions of the patient's history were reviewed and updated as appropriate: allergies, current medications, past family history, past medical history, past social history, past surgical history, and problem list.    Physical Exam  General:  well developed; well nourished  no acute distress   Chest/Respiratory: No labored breathing, normal respiratory effort, normal  appearance, no respiratory noises noted   Heart:  normal rate, regular rhythm,  no murmurs, rubs, or gallops   Thyroid: not examined   Breasts:  Not performed.   Abdomen: soft, non-tender; no masses  no umbilical or inguinal hernias are present  no hepato-splenomegaly   Pelvis: Not performed.        Assessment and Plan    Problem List Items Addressed This Visit          Gravid and     History of  section - Primary    Overview     [id number] at 39 weeks for nonreassuring fetal wellbeing baby boy weighing 6 pounds 14 ounces named will         Prenatal care    Relevant Orders    Obstetric Panel    HIV-1 / O / 2 Ag / Antibody    Urine Culture - Urine, Urine, Clean Catch    Urinalysis With Microscopic - Urine, Clean Catch    Chlamydia trachomatis, Neisseria gonorrhoeae, PCR - Urine, Urine, Clean Catch       Pregnancy at 8w1d  Reviewed routine prenatal care with the office and educational materials given  Lab(s) Ordered  Nausea/Vomiting - she does not desire medications at this time.  Discussed conservative ways to help with nausea.  Patient is on Prenatal vitamins  Activity recommendation : 150 minutes/week of moderate intensity aerobic activity unless we limit for bleeding, hypertension or other pregnancy complication   Return in about 4 weeks (around 2023).      Kavitha Paniagua MD  10/16/2023

## 2023-10-17 LAB
ABO GROUP BLD: ABNORMAL
BASOPHILS # BLD AUTO: 0 X10E3/UL (ref 0–0.2)
BASOPHILS NFR BLD AUTO: 1 %
BLD GP AB SCN SERPL QL: NEGATIVE
EOSINOPHIL # BLD AUTO: 0 X10E3/UL (ref 0–0.4)
EOSINOPHIL NFR BLD AUTO: 1 %
ERYTHROCYTE [DISTWIDTH] IN BLOOD BY AUTOMATED COUNT: 13.2 % (ref 11.7–15.4)
HBV SURFACE AG SERPL QL IA: NEGATIVE
HCT VFR BLD AUTO: 38.6 % (ref 34–46.6)
HCV IGG SERPL QL IA: NON REACTIVE
HGB BLD-MCNC: 12.9 G/DL (ref 11.1–15.9)
HIV 1+2 AB+HIV1 P24 AG SERPL QL IA: NON REACTIVE
IMM GRANULOCYTES # BLD AUTO: 0 X10E3/UL (ref 0–0.1)
IMM GRANULOCYTES NFR BLD AUTO: 0 %
LYMPHOCYTES # BLD AUTO: 1.7 X10E3/UL (ref 0.7–3.1)
LYMPHOCYTES NFR BLD AUTO: 29 %
MCH RBC QN AUTO: 26.4 PG (ref 26.6–33)
MCHC RBC AUTO-ENTMCNC: 33.4 G/DL (ref 31.5–35.7)
MCV RBC AUTO: 79 FL (ref 79–97)
MONOCYTES # BLD AUTO: 0.5 X10E3/UL (ref 0.1–0.9)
MONOCYTES NFR BLD AUTO: 9 %
NEUTROPHILS # BLD AUTO: 3.6 X10E3/UL (ref 1.4–7)
NEUTROPHILS NFR BLD AUTO: 60 %
PLATELET # BLD AUTO: 172 X10E3/UL (ref 150–450)
RBC # BLD AUTO: 4.89 X10E6/UL (ref 3.77–5.28)
RH BLD: POSITIVE
RPR SER QL: NON REACTIVE
RUBV IGG SERPL IA-ACNC: 12.3 INDEX
WBC # BLD AUTO: 6 X10E3/UL (ref 3.4–10.8)

## 2023-10-18 LAB
C TRACH RRNA SPEC QL NAA+PROBE: NEGATIVE
N GONORRHOEA RRNA SPEC QL NAA+PROBE: NEGATIVE

## 2023-10-19 LAB
BACTERIA UR CULT: ABNORMAL
BACTERIA UR CULT: ABNORMAL

## 2023-10-20 ENCOUNTER — TELEPHONE (OUTPATIENT)
Dept: OBSTETRICS AND GYNECOLOGY | Facility: CLINIC | Age: 33
End: 2023-10-20

## 2023-10-20 DIAGNOSIS — B95.1 POSITIVE GBS TEST: Primary | ICD-10-CM

## 2023-10-20 RX ORDER — CEPHALEXIN 500 MG/1
500 CAPSULE ORAL 2 TIMES DAILY
Qty: 14 CAPSULE | Refills: 0 | Status: SHIPPED | OUTPATIENT
Start: 2023-10-20 | End: 2023-10-27

## 2023-10-23 DIAGNOSIS — B95.1 POSITIVE GBS TEST: Primary | ICD-10-CM

## 2023-10-25 LAB
BACTERIA UR CULT: NO GROWTH
BACTERIA UR CULT: NORMAL

## 2023-11-16 ENCOUNTER — ROUTINE PRENATAL (OUTPATIENT)
Dept: OBSTETRICS AND GYNECOLOGY | Facility: CLINIC | Age: 33
End: 2023-11-16
Payer: COMMERCIAL

## 2023-11-16 VITALS — BODY MASS INDEX: 19.91 KG/M2 | DIASTOLIC BLOOD PRESSURE: 58 MMHG | WEIGHT: 134.8 LBS | SYSTOLIC BLOOD PRESSURE: 110 MMHG

## 2023-11-16 DIAGNOSIS — Z34.90 PRENATAL CARE, ANTEPARTUM: ICD-10-CM

## 2023-11-16 DIAGNOSIS — Z98.891 HISTORY OF CESAREAN SECTION: ICD-10-CM

## 2023-11-16 DIAGNOSIS — B95.1 POSITIVE GBS TEST: Primary | ICD-10-CM

## 2023-11-16 PROBLEM — N92.1 BREAKTHROUGH BLEEDING ON OCPS: Status: RESOLVED | Noted: 2023-01-31 | Resolved: 2023-11-16

## 2023-11-16 LAB
GLUCOSE UR STRIP-MCNC: NEGATIVE MG/DL
PROT UR STRIP-MCNC: NEGATIVE MG/DL

## 2023-11-16 RX ORDER — PRENATAL VIT NO.126/IRON/FOLIC 28MG-0.8MG
1 TABLET ORAL DAILY
COMMUNITY

## 2023-11-16 NOTE — PROGRESS NOTES
OB FOLLOW UP  CC- Here for care of pregnancy        Alyse Bojorquez is a 33 y.o.  12w4d patient being seen today for her obstetrical follow up visit. Patient reports nausea with vomiting for 1 per week. She reports headaches, but denies vision changes. She states she just drinks caffeine and it helps.    Her prenatal care is complicated by (and status) :   Patient Active Problem List   Diagnosis    External hemorrhoids    History of  section    Prenatal care    Positive GBS test       Desires genetic testing?: No  Flu Status: Already given in current flu season  Ultrasound Today: No    ROS -   Patient Reports : Nausea and headaches  Patient Denies: Loss of Fluid, Vaginal Spotting, and Vision Changes  Fetal Movement :  absent  All other systems reviewed and are negative.     The additional following portions of the patient's history were reviewed and updated as appropriate: allergies, current medications, past family history, past medical history, past social history, past surgical history, and problem list.    I have reviewed and agree with the HPI, ROS, and historical information as entered above. Kavitha Paniagua MD          /58   Wt 61.1 kg (134 lb 12.8 oz)   LMP 2023   BMI 19.91 kg/m²         EXAM:     Prenatal Vitals  BP: 110/58  Weight: 61.1 kg (134 lb 12.8 oz)   Fetal Heart Rate: 155          Urine Glucose Read-only: Negative  Urine Protein Read-only: Negative       Assessment and Plan    Problem List Items Addressed This Visit          Gravid and     History of  section    Overview     [id number] at 39 weeks for nonreassuring fetal wellbeing baby boy weighing 6 pounds 14 ounces named will    Repeat scheduled 2024 at 7:30 AM.         Prenatal care    Relevant Orders    POC Urinalysis Dipstick (Completed)       Other    Positive GBS test - Primary    Overview     In urine and new OB visit.  Treated.            Pregnancy at 12w4d  Labs  reviewed from New OB Visit.  Counseled on genetic testing, carrier status and option for NT screen  Activity and Exercise discussed.  Patient is on Prenatal vitamins  Discussed magnesium to help with headaches.  Return in about 4 weeks (around 12/14/2023).    Kavitha Paniagua MD  11/16/2023

## 2023-12-14 ENCOUNTER — ROUTINE PRENATAL (OUTPATIENT)
Dept: OBSTETRICS AND GYNECOLOGY | Facility: CLINIC | Age: 33
End: 2023-12-14
Payer: COMMERCIAL

## 2023-12-14 VITALS — SYSTOLIC BLOOD PRESSURE: 104 MMHG | BODY MASS INDEX: 20.08 KG/M2 | DIASTOLIC BLOOD PRESSURE: 62 MMHG | WEIGHT: 136 LBS

## 2023-12-14 DIAGNOSIS — Z34.90 PRENATAL CARE, ANTEPARTUM: ICD-10-CM

## 2023-12-14 DIAGNOSIS — Z98.891 HISTORY OF CESAREAN SECTION: ICD-10-CM

## 2023-12-14 DIAGNOSIS — B95.1 POSITIVE GBS TEST: Primary | ICD-10-CM

## 2023-12-14 LAB
GLUCOSE UR STRIP-MCNC: NEGATIVE MG/DL
PROT UR STRIP-MCNC: NEGATIVE MG/DL

## 2023-12-14 NOTE — PROGRESS NOTES
OB FOLLOW UP  CC- Here for care of pregnancy        Alyse Bojorquez is a 33 y.o.  16w4d patient being seen today for her obstetrical follow up visit. Patient reports occasional nausea and vomiting. Pt reports round ligament pain, states pain occurs if she sneezes, coughs or gets up too quickly.     Her prenatal care is complicated by (and status) :   Patient Active Problem List   Diagnosis    External hemorrhoids    History of  section    Prenatal care    Positive GBS test       Flu Status: Already given in current flu season  Ultrasound Today: No    AFP: declines    ROS -   Patient Denies: Loss of Fluid, Vaginal Spotting, Vision Changes, Headaches, and Epigastric pain  Fetal Movement : absent  All other systems reviewed and are negative.       The additional following portions of the patient's history were reviewed and updated as appropriate: allergies, current medications, past family history, past medical history, past social history, past surgical history, and problem list.      I have reviewed and agree with the HPI, ROS, and historical information as entered above. Kavitha Paniagua MD          EXAM:     Prenatal Vitals  BP: 104/62  Weight: 61.7 kg (136 lb)   Fetal Heart Rate: 141   Pelvic Exam:        Urine Glucose Read-only: Negative  Urine Protein Read-only: Negative           Assessment and Plan    Problem List Items Addressed This Visit          Gravid and     History of  section    Overview     [id number] at 39 weeks for nonreassuring fetal wellbeing baby boy weighing 6 pounds 14 ounces named will    Repeat scheduled 2024 at 7:30 AM.         Relevant Orders    US Pelvis Complete    Prenatal care    Relevant Orders    US Pelvis Complete    POC Urinalysis Dipstick (Completed)       Other    Positive GBS test - Primary    Overview     In urine and new OB visit.  Treated.            Pregnancy at 16w4d  Fetal status reassuring.   Counseled on MSAFP alone  in relation to OTD and placental issues.    Anatomy scan next visit.   Activity and Exercise discussed.  Patient is on Prenatal vitamins  Return in about 4 weeks (around 1/11/2024) for anatomy usg.    Kavitha Paniagua MD  12/14/2023

## 2024-01-04 ENCOUNTER — TELEPHONE (OUTPATIENT)
Dept: OBSTETRICS AND GYNECOLOGY | Facility: CLINIC | Age: 34
End: 2024-01-04
Payer: COMMERCIAL

## 2024-01-04 NOTE — TELEPHONE ENCOUNTER
Called patient. She tested negative for covid and flu. Nasal congestion, runny nose, sneezing, low grade fever. Chills and body aches. Suggested saline nasal spray, cool mist humidifer, mucinex, antihistamines, cough drops, robitussin. Tylenol for fever not exceeding 4g/day. VU.

## 2024-01-04 NOTE — TELEPHONE ENCOUNTER
Calling to report that she has come down with the flu and wants to know any steps she may need to take or certain symptoms to keep an eye on. She's 20 wks.

## 2024-01-11 ENCOUNTER — ROUTINE PRENATAL (OUTPATIENT)
Dept: OBSTETRICS AND GYNECOLOGY | Facility: CLINIC | Age: 34
End: 2024-01-11
Payer: COMMERCIAL

## 2024-01-11 VITALS — WEIGHT: 142.6 LBS | DIASTOLIC BLOOD PRESSURE: 68 MMHG | SYSTOLIC BLOOD PRESSURE: 122 MMHG | BODY MASS INDEX: 21.06 KG/M2

## 2024-01-11 DIAGNOSIS — B95.1 POSITIVE GBS TEST: ICD-10-CM

## 2024-01-11 DIAGNOSIS — Z98.891 HISTORY OF CESAREAN SECTION: ICD-10-CM

## 2024-01-11 DIAGNOSIS — Z34.90 PRENATAL CARE, ANTEPARTUM: Primary | ICD-10-CM

## 2024-01-11 LAB
GLUCOSE UR STRIP-MCNC: NEGATIVE MG/DL
PROT UR STRIP-MCNC: NEGATIVE MG/DL

## 2024-01-11 RX ORDER — FAMOTIDINE 20 MG/1
20 TABLET, FILM COATED ORAL 2 TIMES DAILY
COMMUNITY

## 2024-01-11 NOTE — PROGRESS NOTES
OB FOLLOW UP  CC- Here for care of pregnancy        Alyse Bojorquez is a 33 y.o.  20w4d patient being seen today for her obstetrical follow up visit. Patient reports no complaints..     Her prenatal care is complicated by (and status) :   Patient Active Problem List   Diagnosis    External hemorrhoids    History of  section    Prenatal care    Positive GBS test       Flu Status: Already given in current flu season  US done today: Yes.  Findings showed Male fetus in breech presentation fetal heart rate of 155.  Anterior placenta and three-vessel cord noted.  Normal fluid volume with a maximum vertical pocket of 5.9.  Estimated fetal weight 13 ounces with size being consistent with dates.  No fetal anomaly seen.  Cervical length 48 mm..  I have personally evaluated the U/S and agree with the findings. Kavitha Paniagua MD     ROS -   Patient Reports : No Problems  Patient Denies: Loss of Fluid, Vaginal Spotting, Vision Changes, Headaches, Nausea , Vomiting , and Contractions  Fetal Movement : normal  All other systems reviewed and are negative.       The additional following portions of the patient's history were reviewed and updated as appropriate: allergies, current medications, past family history, past medical history, past social history, past surgical history, and problem list.      I have reviewed and agree with the HPI, ROS, and historical information as entered above. Kavitha Paniagua MD      /68   Wt 64.7 kg (142 lb 9.6 oz)   LMP 2023   BMI 21.06 kg/m²       EXAM:     Prenatal Vitals  BP: 122/68  Weight: 64.7 kg (142 lb 9.6 oz)   Fetal Heart Rate: 155 bpm          Urine Glucose Read-only: Negative  Urine Protein Read-only: Negative       Assessment and Plan    Problem List Items Addressed This Visit          Gravid and     History of  section    Overview     [id number] at 39 weeks for nonreassuring fetal wellbeing baby boy weighing 6 pounds  14 ounces named will    Repeat scheduled 5/20/2024 at 7:30 AM.         Prenatal care - Primary    Relevant Orders    POC Urinalysis Dipstick (Completed)       Other    Positive GBS test    Overview     In urine and new OB visit.  Treated.            Pregnancy at 20w4d  Anatomy scan today is complete and appear within normal limits.  Fetal status reassuring.   Activity and Exercise discussed.  Patient is on Prenatal vitamins  Return in about 4 weeks (around 2/8/2024).      Kavitha Paniagua MD  01/11/2024

## 2024-02-08 ENCOUNTER — ROUTINE PRENATAL (OUTPATIENT)
Dept: OBSTETRICS AND GYNECOLOGY | Facility: CLINIC | Age: 34
End: 2024-02-08
Payer: COMMERCIAL

## 2024-02-08 VITALS — BODY MASS INDEX: 22.15 KG/M2 | WEIGHT: 150 LBS | SYSTOLIC BLOOD PRESSURE: 112 MMHG | DIASTOLIC BLOOD PRESSURE: 64 MMHG

## 2024-02-08 DIAGNOSIS — Z98.891 HISTORY OF CESAREAN SECTION: ICD-10-CM

## 2024-02-08 DIAGNOSIS — B95.1 POSITIVE GBS TEST: ICD-10-CM

## 2024-02-08 DIAGNOSIS — Z34.92 PRENATAL CARE IN SECOND TRIMESTER: Primary | ICD-10-CM

## 2024-02-08 DIAGNOSIS — K64.4 EXTERNAL HEMORRHOIDS: ICD-10-CM

## 2024-02-08 LAB
GLUCOSE UR STRIP-MCNC: NEGATIVE MG/DL
PROT UR STRIP-MCNC: NEGATIVE MG/DL

## 2024-02-08 NOTE — PROGRESS NOTES
OB FOLLOW UP  CC- Here for care of pregnancy        Alyse Bojorquez is a 33 y.o.  24w4d patient being seen today for her obstetrical follow up visit. Patient reports heartburn she is taking pepcid. Occasional cramping related to gas.      Her prenatal care is complicated by (and status) :   Patient Active Problem List   Diagnosis    External hemorrhoids    History of  section    Prenatal care    Positive GBS test       Flu Status: Already given in current flu season  Ultrasound Today: No  Reviewed 1 hr glucose testing and TDAP next visit.    ROS -   Patient Denies: leaking of fluid, vaginal bleeding, dysuria, excessive vomiting, and more than 6 contractions per hour  Fetal Movement : normal  All other systems reviewed and are negative.       The additional following portions of the patient's history were reviewed and updated as appropriate: allergies and current medications.      I have reviewed and agree with the HPI, ROS, and historical information as entered above. Kavitha Paniagua MD      /64   Wt 68 kg (150 lb)   LMP 2023   BMI 22.15 kg/m²       EXAM:     Prenatal Vitals  BP: 112/64  Weight: 68 kg (150 lb)   Fetal Heart Rate: 148      Fundal Height (cm): 24 cm        Urine Glucose Read-only: Negative  Urine Protein Read-only: Negative       Assessment and Plan    Problem List Items Addressed This Visit          Gastrointestinal Abdominal     External hemorrhoids    Overview     Followed by Dr. Bowden.             Gravid and     History of  section    Overview     [id number] at 39 weeks for nonreassuring fetal wellbeing baby boy weighing 6 pounds 14 ounces named will    Repeat scheduled 2024 at 7:30 AM.         Prenatal care - Primary    Relevant Orders    POC Urinalysis Dipstick (Completed)       Other    Positive GBS test    Overview     In urine and new OB visit.  Treated.            Pregnancy at 24w4d  Fetal status reassuring.  Recommend  Pepcid prn heartburn  No US indicated today.  1 hour gtt, CBC, Antibody screen, TDAP, and RPR next visit. Instructions given  Discussed/encouraged TDAP vaccination after 28 weeks  Activity and Exercise discussed.  Return in about 4 weeks (around 3/7/2024) for glucola.      Kavitha Paniagua MD  02/08/2024

## 2024-03-07 ENCOUNTER — ROUTINE PRENATAL (OUTPATIENT)
Dept: OBSTETRICS AND GYNECOLOGY | Facility: CLINIC | Age: 34
End: 2024-03-07
Payer: COMMERCIAL

## 2024-03-07 VITALS — BODY MASS INDEX: 22.51 KG/M2 | WEIGHT: 152.4 LBS | DIASTOLIC BLOOD PRESSURE: 66 MMHG | SYSTOLIC BLOOD PRESSURE: 118 MMHG

## 2024-03-07 DIAGNOSIS — Z98.891 HISTORY OF CESAREAN SECTION: ICD-10-CM

## 2024-03-07 DIAGNOSIS — B95.1 POSITIVE GBS TEST: ICD-10-CM

## 2024-03-07 DIAGNOSIS — Z34.90 PRENATAL CARE, ANTEPARTUM: Primary | ICD-10-CM

## 2024-03-07 LAB
ERYTHROCYTE [DISTWIDTH] IN BLOOD BY AUTOMATED COUNT: 13.4 % (ref 12.3–15.4)
GLUCOSE 1H P 50 G GLC PO SERPL-MCNC: 79 MG/DL (ref 65–139)
GLUCOSE UR STRIP-MCNC: NEGATIVE MG/DL
HCT VFR BLD AUTO: 38 % (ref 34–46.6)
HGB BLD-MCNC: 12.3 G/DL (ref 12–15.9)
MCH RBC QN AUTO: 27.1 PG (ref 26.6–33)
MCHC RBC AUTO-ENTMCNC: 32.4 G/DL (ref 31.5–35.7)
MCV RBC AUTO: 83.7 FL (ref 79–97)
PLATELET # BLD AUTO: 156 10*3/MM3 (ref 140–450)
PROT UR STRIP-MCNC: NEGATIVE MG/DL
RBC # BLD AUTO: 4.54 10*6/MM3 (ref 3.77–5.28)
WBC # BLD AUTO: 10.41 10*3/MM3 (ref 3.4–10.8)

## 2024-03-07 NOTE — PROGRESS NOTES
OB FOLLOW UP  CC- Here for care of pregnancy        Alyse Bojorquez is a 33 y.o.  28w4d patient being seen today for her obstetrical follow up. Patient reports occasional nausea and vomiting due to reflux. Pt reports pain in mid epigastric area and left flank plank for past 1 month. Pt reports numbness in hands at night and left hand turning purple occasionally.     Patient undergoing Glucola testing today. She is due for her testing at 9:25.       MBT: O+  Rhogam: is not indicated.  28 week packet: reviewed with patient , counseled on fetal movement , pediatrician list reviewed, breast pump discussed, and childbirth classes reviewed  TDAP: given today  Flu Status: Already given in current flu season  Ultrasound Today: No    Her prenatal care is complicated by (and status) :   Patient Active Problem List   Diagnosis    External hemorrhoids    History of  section    Prenatal care    Positive GBS test         ROS -   Patient Denies: Loss of Fluid, Vaginal Spotting, Vision Changes, Headaches, Contractions, Epigastric pain, and skin itching  Fetal Movement : normal    The additional following portions of the patient's history were reviewed and updated as appropriate: allergies and current medications.    I have reviewed and agree with the HPI, ROS, and historical information as entered above. Kavitha Paniagua MD      /66   Wt 69.1 kg (152 lb 6.4 oz)   LMP 2023   BMI 22.51 kg/m²         EXAM:     Prenatal Vitals  BP: 118/66  Weight: 69.1 kg (152 lb 6.4 oz)                   Urine Glucose Read-only: Negative  Urine Protein Read-only: Negative         Assessment and Plan    Problem List Items Addressed This Visit          Gravid and     History of  section    Overview     [id number] at 39 weeks for nonreassuring fetal wellbeing baby boy weighing 6 pounds 14 ounces named will    Repeat scheduled 2024 at 7:30 AM.         Prenatal care - Primary     Relevant Orders    CBC (No Diff)    Gestational Screen 1 Hr ([facility])    Antibody Screen    RPR    POC Urinalysis Dipstick (Completed)    Tdap Vaccine Greater Than or Equal To 6yo IM (Completed)       Other    Positive GBS test    Overview     In urine and new OB visit.  Treated.            Pregnancy at 28w4d  Sakina compression stockings and sitting on a yoga ball to help with back pain.  1 hr Glucola, CBC, RPR. Antibody screen and TDAP today  Fetal movement/PTL or Labor precautions  Activity and Exercise discussed.  Return in about 4 weeks (around 4/4/2024).        Kavitha Paniagua MD  03/07/2024

## 2024-03-08 LAB
BLD GP AB SCN SERPL QL: NEGATIVE
RPR SER QL: NON REACTIVE

## 2024-03-14 ENCOUNTER — ROUTINE PRENATAL (OUTPATIENT)
Dept: OBSTETRICS AND GYNECOLOGY | Facility: CLINIC | Age: 34
End: 2024-03-14
Payer: COMMERCIAL

## 2024-03-14 ENCOUNTER — TELEPHONE (OUTPATIENT)
Dept: OBSTETRICS AND GYNECOLOGY | Facility: CLINIC | Age: 34
End: 2024-03-14
Payer: COMMERCIAL

## 2024-03-14 VITALS — WEIGHT: 153 LBS | SYSTOLIC BLOOD PRESSURE: 130 MMHG | BODY MASS INDEX: 22.59 KG/M2 | DIASTOLIC BLOOD PRESSURE: 76 MMHG

## 2024-03-14 DIAGNOSIS — R10.2 PELVIC PAIN: ICD-10-CM

## 2024-03-14 DIAGNOSIS — R31.9 HEMATURIA, UNSPECIFIED TYPE: ICD-10-CM

## 2024-03-14 DIAGNOSIS — Z34.90 PRENATAL CARE, ANTEPARTUM: Primary | ICD-10-CM

## 2024-03-14 DIAGNOSIS — O26.899 CRAMPING AFFECTING PREGNANCY, ANTEPARTUM: ICD-10-CM

## 2024-03-14 DIAGNOSIS — R10.9 CRAMPING AFFECTING PREGNANCY, ANTEPARTUM: ICD-10-CM

## 2024-03-14 LAB
BILIRUB BLD-MCNC: NEGATIVE MG/DL
CLARITY, POC: ABNORMAL
COLOR UR: YELLOW
GLUCOSE UR STRIP-MCNC: NEGATIVE MG/DL
KETONES UR QL: NEGATIVE
LEUKOCYTE EST, POC: NEGATIVE
NITRITE UR-MCNC: NEGATIVE MG/ML
PH UR: 7.5 [PH] (ref 5–8)
PROT UR STRIP-MCNC: NEGATIVE MG/DL
RBC # UR STRIP: ABNORMAL /UL
SP GR UR: 1.01 (ref 1–1.03)
UROBILINOGEN UR QL: NORMAL

## 2024-03-14 NOTE — TELEPHONE ENCOUNTER
29w4d Ob patient is calling because she has been having some lower cramping since last night, Patient did call on call provider and was told a few things. Patient is concerned because she is still cramping. Patient did say she feels like baby is still moving normal.

## 2024-03-14 NOTE — TELEPHONE ENCOUNTER
"Returned patient's call. Patient of Dr. Paniagua;  @ 29w4d. LOV was 3/7/24; next prenatal visit is 24.   Reports normal fetal movement.   States she started having menstrual-like cramping lasts night around 10 pm. She called on-call provider, Dr. Westfall around 11:30 pm. Was told to increase fluids and rest and to call if did not improve.   States there is a constant \"menstrual pain\" in lower abdomen and lower back with a sharp pain in lower abdomen about every 15-20 minutes that lasts less than a minute. States lower abdomen feels \"a little bit tight\" when the sharp pain occurs.   Denies any bleeding or leaking fluid. Denies any urinary urgency, frequency, or dysuria.   Instructed patient to come to office for evaluation. She v/u and agreed; she will come now.   "

## 2024-03-14 NOTE — PROGRESS NOTES
OB FOLLOW UP  CC- Here for care of pregnancy        Alyse Bojorquez is a 33 y.o.  29w4d patient being seen today for {constant dull menstrual like abdominal pain with low back pain. She denies dysuria.      Her prenatal care is complicated by (and status) :    Patient Active Problem List   Diagnosis    External hemorrhoids    History of  section    Prenatal care    Positive GBS test         Ultrasound Today: No.    ROS -   Patient Reports :  see above  Patient Denies: Loss of Fluid, Vaginal Spotting, Vision Changes, Headaches, Nausea , Vomiting , Epigastric pain, and skin itching  Fetal Movement : normal  All other systems reviewed and are negative.       The additional following portions of the patient's history were reviewed and updated as appropriate: allergies, current medications, past medical history, past surgical history, and problem list.    I have reviewed and agree with the HPI, ROS, and historical information as entered above. Kim Fuentesf, APRN      /76   Wt 69.4 kg (153 lb)   LMP 2023   BMI 22.59 kg/m²       EXAM:     Prenatal Vitals  BP: 130/76  Weight: 69.4 kg (153 lb)   Fetal Heart Rate: NST   Dilation/Effacement/Station  Dilation: Closed    NST reactive  20+ minutes  Indication: cramping, hematuria            Assessment and Plan    Problem List Items Addressed This Visit          Gravid and     Prenatal care - Primary    Relevant Orders    POC Urinalysis Dipstick (Completed)    Urine Culture - Urine, Urine, Clean Catch     Other Visit Diagnoses       Pelvic pain        Relevant Orders    Urine Culture - Urine, Urine, Clean Catch    Cramping affecting pregnancy, antepartum        Relevant Orders    US Ob Limited 1 + Fetuses    Hematuria, unspecified type                Pregnancy at 29w4d  Fetal status reassuring.   CCUA neg with blood--- sent for C&S; asymptomatic; push water  NST reactive with no ctxs.  Cervix closed.  Cx length 3.5.  Return if symptoms  worsen or fail to improve, for Next scheduled follow up.    Kim Cruz, APRN  03/14/2024

## 2024-03-16 LAB
BACTERIA UR CULT: NORMAL
BACTERIA UR CULT: NORMAL

## 2024-04-09 ENCOUNTER — ROUTINE PRENATAL (OUTPATIENT)
Dept: OBSTETRICS AND GYNECOLOGY | Facility: CLINIC | Age: 34
End: 2024-04-09
Payer: COMMERCIAL

## 2024-04-09 VITALS — WEIGHT: 160 LBS | DIASTOLIC BLOOD PRESSURE: 60 MMHG | BODY MASS INDEX: 23.63 KG/M2 | SYSTOLIC BLOOD PRESSURE: 110 MMHG

## 2024-04-09 DIAGNOSIS — Z34.90 PRENATAL CARE, ANTEPARTUM: Primary | ICD-10-CM

## 2024-04-09 DIAGNOSIS — B95.1 POSITIVE GBS TEST: ICD-10-CM

## 2024-04-09 DIAGNOSIS — Z98.891 HISTORY OF CESAREAN SECTION: ICD-10-CM

## 2024-04-09 DIAGNOSIS — K64.4 EXTERNAL HEMORRHOIDS: ICD-10-CM

## 2024-04-09 LAB
GLUCOSE UR STRIP-MCNC: NEGATIVE MG/DL
PROT UR STRIP-MCNC: NEGATIVE MG/DL

## 2024-04-09 PROCEDURE — 0502F SUBSEQUENT PRENATAL CARE: CPT | Performed by: OBSTETRICS & GYNECOLOGY

## 2024-04-09 NOTE — PROGRESS NOTES
OB FOLLOW UP  CC- Here for care of pregnancy        Alyse Bojorquez is a 33 y.o.  33w2d patient being seen today for her obstetrical follow up visit. Patient reports BH ctx. Reports vaginal d/c increased. No discoloration, itching, burning, or odor.     Her prenatal care is complicated by (and status) :    Patient Active Problem List   Diagnosis    External hemorrhoids    History of  section    Prenatal care    Positive GBS test     TDAP status: received at last visit  Rhogam status: was not indicated  28 week labs: Reviewed and normal  Ultrasound Today: No  Non Stress Test: No.      ROS -   Patient Denies: Loss of Fluid, Vaginal Spotting, Vision Changes, and Headaches  Fetal Movement : normal  All other systems reviewed and are negative.       The additional following portions of the patient's history were reviewed and updated as appropriate: allergies, current medications, past family history, past medical history, past social history, past surgical history, and problem list.    I have reviewed and agree with the HPI, ROS, and historical information as entered above. Kavitha Paniagua MD      /60   Wt 72.6 kg (160 lb)   LMP 2023   BMI 23.63 kg/m²         EXAM:     Prenatal Vitals  BP: 110/60  Weight: 72.6 kg (160 lb)   Fetal Heart Rate: 148      Fundal Height (cm): 33 cm        Urine Glucose Read-only: Negative  Urine Protein Read-only: Negative           Assessment and Plan    Problem List Items Addressed This Visit          Gastrointestinal Abdominal     External hemorrhoids    Overview     Followed by Dr. Bowden.             Gravid and     History of  section    Overview     [id number] at 39 weeks for nonreassuring fetal wellbeing baby boy weighing 6 pounds 14 ounces named will    Repeat scheduled 2024 at 7:30 AM.         Prenatal care - Primary    Relevant Orders    POC Urinalysis Dipstick (Completed)       Other    Positive GBS test     Overview     In urine and new OB visit.  Treated.            Pregnancy at 33w2d  Fetal status reassuring.  28 week labs reviewed.    Activity and Exercise discussed.  Fetal movement/PTL or Labor precautions  Return in about 2 weeks (around 4/23/2024).    Kavitha Paniagua MD  04/09/2024

## 2024-04-22 ENCOUNTER — PREP FOR SURGERY (OUTPATIENT)
Dept: OTHER | Facility: HOSPITAL | Age: 34
End: 2024-04-22
Payer: COMMERCIAL

## 2024-04-22 DIAGNOSIS — Z98.891 HISTORY OF CESAREAN SECTION: Primary | ICD-10-CM

## 2024-04-22 PROBLEM — Z34.90 PREGNANCY: Status: ACTIVE | Noted: 2024-04-22

## 2024-04-22 RX ORDER — SODIUM CHLORIDE 0.9 % (FLUSH) 0.9 %
10 SYRINGE (ML) INJECTION EVERY 12 HOURS SCHEDULED
OUTPATIENT
Start: 2024-04-22

## 2024-04-22 RX ORDER — CITRIC ACID/SODIUM CITRATE 334-500MG
30 SOLUTION, ORAL ORAL ONCE
OUTPATIENT
Start: 2024-04-22 | End: 2024-04-22

## 2024-04-22 RX ORDER — ACETAMINOPHEN 500 MG
1000 TABLET ORAL ONCE
OUTPATIENT
Start: 2024-04-22 | End: 2024-04-22

## 2024-04-22 RX ORDER — OXYTOCIN/0.9 % SODIUM CHLORIDE 30/500 ML
85 PLASTIC BAG, INJECTION (ML) INTRAVENOUS ONCE
OUTPATIENT
Start: 2024-04-22 | End: 2024-04-22

## 2024-04-22 RX ORDER — METHYLERGONOVINE MALEATE 0.2 MG/ML
200 INJECTION INTRAVENOUS ONCE AS NEEDED
OUTPATIENT
Start: 2024-04-22

## 2024-04-22 RX ORDER — MISOPROSTOL 200 UG/1
800 TABLET ORAL AS NEEDED
OUTPATIENT
Start: 2024-04-22

## 2024-04-22 RX ORDER — OXYTOCIN/0.9 % SODIUM CHLORIDE 30/500 ML
650 PLASTIC BAG, INJECTION (ML) INTRAVENOUS ONCE
OUTPATIENT
Start: 2024-04-22 | End: 2024-04-22

## 2024-04-22 RX ORDER — SODIUM CHLORIDE, SODIUM LACTATE, POTASSIUM CHLORIDE, CALCIUM CHLORIDE 600; 310; 30; 20 MG/100ML; MG/100ML; MG/100ML; MG/100ML
125 INJECTION, SOLUTION INTRAVENOUS CONTINUOUS
OUTPATIENT
Start: 2024-04-22

## 2024-04-22 RX ORDER — LIDOCAINE HYDROCHLORIDE 10 MG/ML
0.5 INJECTION, SOLUTION EPIDURAL; INFILTRATION; INTRACAUDAL; PERINEURAL ONCE AS NEEDED
OUTPATIENT
Start: 2024-04-22

## 2024-04-22 RX ORDER — CARBOPROST TROMETHAMINE 250 UG/ML
250 INJECTION, SOLUTION INTRAMUSCULAR AS NEEDED
OUTPATIENT
Start: 2024-04-22

## 2024-04-22 RX ORDER — KETOROLAC TROMETHAMINE 30 MG/ML
30 INJECTION, SOLUTION INTRAMUSCULAR; INTRAVENOUS ONCE
OUTPATIENT
Start: 2024-04-22 | End: 2024-04-22

## 2024-04-22 RX ORDER — SODIUM CHLORIDE 9 MG/ML
40 INJECTION, SOLUTION INTRAVENOUS AS NEEDED
OUTPATIENT
Start: 2024-04-22

## 2024-04-22 RX ORDER — SODIUM CHLORIDE 0.9 % (FLUSH) 0.9 %
10 SYRINGE (ML) INJECTION AS NEEDED
OUTPATIENT
Start: 2024-04-22

## 2024-04-23 ENCOUNTER — ROUTINE PRENATAL (OUTPATIENT)
Dept: OBSTETRICS AND GYNECOLOGY | Facility: CLINIC | Age: 34
End: 2024-04-23
Payer: COMMERCIAL

## 2024-04-23 VITALS — BODY MASS INDEX: 23.86 KG/M2 | WEIGHT: 161.6 LBS | SYSTOLIC BLOOD PRESSURE: 110 MMHG | DIASTOLIC BLOOD PRESSURE: 70 MMHG

## 2024-04-23 DIAGNOSIS — B95.1 POSITIVE GBS TEST: Primary | ICD-10-CM

## 2024-04-23 DIAGNOSIS — Z34.93 PRENATAL CARE IN THIRD TRIMESTER: ICD-10-CM

## 2024-04-23 DIAGNOSIS — Z98.891 HISTORY OF CESAREAN SECTION: ICD-10-CM

## 2024-04-23 LAB
GLUCOSE UR STRIP-MCNC: NEGATIVE MG/DL
PROT UR STRIP-MCNC: NEGATIVE MG/DL

## 2024-04-23 PROCEDURE — 0502F SUBSEQUENT PRENATAL CARE: CPT | Performed by: OBSTETRICS & GYNECOLOGY

## 2024-04-23 NOTE — PROGRESS NOTES
OB FOLLOW UP  CC- Here for care of pregnancy        Alyse Bojorquez is a 33 y.o.  35w2d patient being seen today for her obstetrical follow up visit. Patient reports no complaints.     Her prenatal care is complicated by (and status) :   Patient Active Problem List   Diagnosis    External hemorrhoids    History of  section    Prenatal care    Positive GBS test    Pregnancy       Ultrasound Today: No  Non Stress Test: No.    ROS -   Patient Denies: Loss of Fluid, Vaginal Spotting, Vision Changes, Headaches, and Contractions  Fetal Movement : normal  All other systems reviewed and are negative.       The additional following portions of the patient's history were reviewed and updated as appropriate: allergies, current medications, past family history, past medical history, past social history, past surgical history, and problem list.    I have reviewed and agree with the HPI, ROS, and historical information as entered above. Kavitha Paniagua MD      /70   Wt 73.3 kg (161 lb 9.6 oz)   LMP 2023   BMI 23.86 kg/m²       EXAM:     Prenatal Vitals  BP: 110/70  Weight: 73.3 kg (161 lb 9.6 oz)   Fetal Heart Rate: 153      Fundal Height (cm): 35 cm        Urine Glucose Read-only: Negative  Urine Protein Read-only: Negative           Assessment and Plan    Problem List Items Addressed This Visit          Gravid and     History of  section    Overview     [id number] at 39 weeks for nonreassuring fetal wellbeing baby boy weighing 6 pounds 14 ounces named will    Repeat scheduled 2024 at 7:30 AM.- orders in         Prenatal care    Relevant Orders    POC Urinalysis Dipstick (Completed)       Other    Positive GBS test - Primary    Overview     In urine and new OB visit.  Treated.            Pregnancy at 35w2d  Fetal status reassuring.   Activity and Exercise discussed.  Fetal movement/PTL or Labor precautions  Return in about 2 weeks (around 2024).    Kavitha  Jeramy Paniagua MD  04/23/2024

## 2024-05-06 ENCOUNTER — ROUTINE PRENATAL (OUTPATIENT)
Dept: OBSTETRICS AND GYNECOLOGY | Facility: CLINIC | Age: 34
End: 2024-05-06
Payer: COMMERCIAL

## 2024-05-06 VITALS — SYSTOLIC BLOOD PRESSURE: 124 MMHG | WEIGHT: 163 LBS | DIASTOLIC BLOOD PRESSURE: 80 MMHG | BODY MASS INDEX: 24.07 KG/M2

## 2024-05-06 DIAGNOSIS — B95.1 POSITIVE GBS TEST: ICD-10-CM

## 2024-05-06 DIAGNOSIS — Z98.891 HISTORY OF CESAREAN SECTION: ICD-10-CM

## 2024-05-06 DIAGNOSIS — Z34.90 PRENATAL CARE, ANTEPARTUM: Primary | ICD-10-CM

## 2024-05-06 LAB
GLUCOSE UR STRIP-MCNC: NEGATIVE MG/DL
PROT UR STRIP-MCNC: NEGATIVE MG/DL

## 2024-05-06 PROCEDURE — 0502F SUBSEQUENT PRENATAL CARE: CPT | Performed by: OBSTETRICS & GYNECOLOGY

## 2024-05-13 ENCOUNTER — ROUTINE PRENATAL (OUTPATIENT)
Dept: OBSTETRICS AND GYNECOLOGY | Facility: CLINIC | Age: 34
End: 2024-05-13
Payer: COMMERCIAL

## 2024-05-13 VITALS — BODY MASS INDEX: 24.28 KG/M2 | SYSTOLIC BLOOD PRESSURE: 112 MMHG | DIASTOLIC BLOOD PRESSURE: 78 MMHG | WEIGHT: 164.4 LBS

## 2024-05-13 DIAGNOSIS — B95.1 POSITIVE GBS TEST: ICD-10-CM

## 2024-05-13 DIAGNOSIS — Z98.891 HISTORY OF CESAREAN SECTION: ICD-10-CM

## 2024-05-13 DIAGNOSIS — Z34.90 PRENATAL CARE, ANTEPARTUM: Primary | ICD-10-CM

## 2024-05-13 LAB
GLUCOSE UR STRIP-MCNC: NEGATIVE MG/DL
PROT UR STRIP-MCNC: NEGATIVE MG/DL

## 2024-05-13 PROCEDURE — 0502F SUBSEQUENT PRENATAL CARE: CPT | Performed by: OBSTETRICS & GYNECOLOGY

## 2024-05-13 NOTE — PROGRESS NOTES
OB FOLLOW UP  CC- Here for care of pregnancy        Alyse Bojorquez is a 33 y.o.  38w1d patient being seen today for her obstetrical follow up visit. Patient reports no complaints.     Her prenatal care is complicated by (and status) :   Patient Active Problem List   Diagnosis    External hemorrhoids    History of  section    Prenatal care    Positive GBS test    Pregnancy       GBS Status:   Group B Strep Culture   Date Value Ref Range Status   2021 Streptococcus agalactiae (Group B) (A)  Corrected         Allergies   Allergen Reactions    Vancomycin Other (See Comments)     Red Man's Syndrome with IV; she tolerates oral Vanc fine with no reaction    Amoxicillin Rash    Bactrim [Sulfamethoxazole-Trimethoprim] Rash    Penicillins Rash        Her Delivery Plan is: Repeat . Scheduled   @ 0730  US today: no  Non Stress Test: No.    ROS -   Patient Denies: Loss of Fluid, Vaginal Spotting, Vision Changes, Headaches, Nausea , Vomiting , Contractions, Epigastric pain, and skin itching  Fetal Movement : normal  All other systems reviewed and are negative.       The additional following portions of the patient's history were reviewed and updated as appropriate: allergies, current medications, past family history, past medical history, past social history, past surgical history, and problem list.    I have reviewed and agree with the HPI, ROS, and historical information as entered above. Kavitha Paniagua MD        EXAM:     Prenatal Vitals  BP: 112/78  Weight: 74.6 kg (164 lb 6.4 oz)   Fetal Heart Rate: 140   Fundal Height (cm): 38 cm          Urine Glucose Read-only: Negative  Urine Protein Read-only: Negative           Assessment and Plan    Problem List Items Addressed This Visit          Gravid and     History of  section    Overview     [id number] at 39 weeks for nonreassuring fetal wellbeing baby boy weighing 6 pounds 14 ounces named will    Repeat  scheduled 5/20/2024 at 7:30 AM.- orders in         Prenatal care - Primary    Relevant Orders    POC Urinalysis Dipstick (Completed)       Other    Positive GBS test    Overview     In urine and new OB visit.  Treated.            Pregnancy at 38w1d  Fetal status reassuring.   Reviewed upcoming c/s and PAT instructions with patient. Arrival time and NPO status reviewed.   Delivery options reviewed with patient  Signs of labor reviewed  Kick counts reviewed  Activity and Exercise discussed.  Return in about 3 weeks (around 6/3/2024) for with NP for incision check.    Kavitha Paniagua MD  05/13/2024

## 2024-05-17 ENCOUNTER — PRE-ADMISSION TESTING (OUTPATIENT)
Dept: PREADMISSION TESTING | Facility: HOSPITAL | Age: 34
End: 2024-05-17
Payer: COMMERCIAL

## 2024-05-17 VITALS — HEIGHT: 69 IN | WEIGHT: 160.5 LBS | BODY MASS INDEX: 23.77 KG/M2

## 2024-05-17 DIAGNOSIS — Z98.891 HISTORY OF CESAREAN SECTION: ICD-10-CM

## 2024-05-17 LAB
DEPRECATED RDW RBC AUTO: 40.9 FL (ref 37–54)
ERYTHROCYTE [DISTWIDTH] IN BLOOD BY AUTOMATED COUNT: 13.2 % (ref 12.3–15.4)
HCT VFR BLD AUTO: 39.8 % (ref 34–46.6)
HGB BLD-MCNC: 13.2 G/DL (ref 12–15.9)
MCH RBC QN AUTO: 28.2 PG (ref 26.6–33)
MCHC RBC AUTO-ENTMCNC: 33.2 G/DL (ref 31.5–35.7)
MCV RBC AUTO: 85 FL (ref 79–97)
PLATELET # BLD AUTO: 152 10*3/MM3 (ref 140–450)
PMV BLD AUTO: 10.4 FL (ref 6–12)
RBC # BLD AUTO: 4.68 10*6/MM3 (ref 3.77–5.28)
T PALLIDUM IGG SER QL: NORMAL
WBC NRBC COR # BLD AUTO: 12.83 10*3/MM3 (ref 3.4–10.8)

## 2024-05-17 PROCEDURE — 86780 TREPONEMA PALLIDUM: CPT | Performed by: OBSTETRICS & GYNECOLOGY

## 2024-05-17 PROCEDURE — 36415 COLL VENOUS BLD VENIPUNCTURE: CPT

## 2024-05-17 PROCEDURE — 85027 COMPLETE CBC AUTOMATED: CPT

## 2024-05-17 NOTE — PAT
Patient to apply Chlorhexadine wipes  to surgical area (as instructed) the night before procedure and the AM of procedure. Wipes provided.    Patient instructed to drink 20 ounces of Gatorade or Gatorlyte (if diabetic) and it needs to be completed 1 hour (for Main OR patients) or 2 hours (scheduled  section & [facility] patients) before given arrival time for procedure (NO RED Gatorade and NO Gatorade Zero).    Patient verbalized understanding.    Instructed patient to take two Tylenol extra strength (total of 1000 mg) the night before surgery.    Pt refused t/s in pat since didn't want to wear band for 3 days- baldo prelim pink in pat and ts added to dos in epic.  Message left for dr encinas-   blood bank informed.

## 2024-05-17 NOTE — DISCHARGE INSTRUCTIONS
What to know before your arrive:    -Do not eat, drink or chew gum beginning 8 hours before your scheduled arrival time to the hospital.  Except please drink 20 ounces of Gatorade and complete two hours before your given arrival time to the hospital.  If you drink too close to surgery time, your sugery may  be delayed or cancelled.  Please complete as instructed.     -Do not shave any part of your body including abdomen or pelvic are for two days before your procedure.  -If you are taking a scheduled medication (insulin, blood pressure medicine,antibiotics) please consult with your physician whether to take on the day of surgery.  -Remove all jewelry including rings, wedding bands, and piercing before coming to the hospital.  -Leave important valuables at home.  -Do not wear dark fingernail polish.  -Please take two Tylenol 500 mg tablets the evening before surgery.  -Bring the following with you to the hospital:    -Picture ID and insurance, Medicare or Medicaid cards    -Co-pay/deductible required by insurance (Cash, Check, Credit Card)    -Copy of living will or power  document (if applicable)    -CPAP mask and tubing, not machine (if applicable)    -Skin prep instructions sheet    What to know the day of procedure:    -Park in the [facility], take elevator for first floor, exit to the right and  proceed through the doors to outside, follow the covered sidewalk to the entrance of the [place], follow the hallway and [facility], enter the [place] to your right BEFORE entering the [address] lobby.  Take the elevators to the 3rd floor ([address] [place]).  -Leave unnecessary items in your vehicle, including your suitcase.  Your support  person or a family member can get it for you after your procedure.   -Check in at the reception desk in the lobby of the 3rd floor ([address] [place]).   -One person may accompany you to the pre-op/recovery area.  Please have  other family members wait in the  waiting room.   -An anesthesiologist will meet with your prior to your procedure.   -After anesthesia has been initiated, one person may accompany you in the  operating room.   -No video cameras are permitted in the operating room; only still cameras,  Please.      What to expect while you are in recovery:     -One person may stay with you while you are in recovery.   -If the baby is stable, he/she may visit to initiate breastfeeding & [facility].      CHLORHEXIDINE GLUCONATE WIPES AND INSTRUCTIONS GIVEN TO PATIENT

## 2024-05-20 ENCOUNTER — ANESTHESIA (OUTPATIENT)
Dept: LABOR AND DELIVERY | Facility: HOSPITAL | Age: 34
End: 2024-05-20
Payer: COMMERCIAL

## 2024-05-20 ENCOUNTER — HOSPITAL ENCOUNTER (INPATIENT)
Facility: HOSPITAL | Age: 34
LOS: 3 days | Discharge: HOME | End: 2024-05-23
Attending: OBSTETRICS & GYNECOLOGY | Admitting: OBSTETRICS & GYNECOLOGY
Payer: COMMERCIAL

## 2024-05-20 ENCOUNTER — ANESTHESIA EVENT (OUTPATIENT)
Dept: LABOR AND DELIVERY | Facility: HOSPITAL | Age: 34
End: 2024-05-20
Payer: COMMERCIAL

## 2024-05-20 DIAGNOSIS — Z98.891 HISTORY OF CESAREAN SECTION: ICD-10-CM

## 2024-05-20 LAB
ABO GROUP BLD: NORMAL
AMPHET+METHAMPHET UR QL: NEGATIVE
AMPHETAMINES UR QL: NEGATIVE
BARBITURATES UR QL SCN: NEGATIVE
BENZODIAZ UR QL SCN: NEGATIVE
BLD GP AB SCN SERPL QL: NEGATIVE
BUPRENORPHINE SERPL-MCNC: NEGATIVE NG/ML
CANNABINOIDS SERPL QL: NEGATIVE
COCAINE UR QL: NEGATIVE
FENTANYL UR-MCNC: NEGATIVE NG/ML
METHADONE UR QL SCN: NEGATIVE
OPIATES UR QL: NEGATIVE
OXYCODONE UR QL SCN: NEGATIVE
PCP UR QL SCN: NEGATIVE
RH BLD: POSITIVE
T&S EXPIRATION DATE: NORMAL
TRICYCLICS UR QL SCN: NEGATIVE

## 2024-05-20 PROCEDURE — 25010000002 METOCLOPRAMIDE PER 10 MG: Performed by: NURSE ANESTHETIST, CERTIFIED REGISTERED

## 2024-05-20 PROCEDURE — 25010000002 MIDAZOLAM PER 1 MG: Performed by: NURSE ANESTHETIST, CERTIFIED REGISTERED

## 2024-05-20 PROCEDURE — 25010000002 ONDANSETRON PER 1 MG: Performed by: NURSE ANESTHETIST, CERTIFIED REGISTERED

## 2024-05-20 PROCEDURE — 25810000003 LACTATED RINGERS SOLUTION: Performed by: OBSTETRICS & GYNECOLOGY

## 2024-05-20 PROCEDURE — 25010000002 FENTANYL CITRATE (PF) 100 MCG/2ML SOLUTION: Performed by: NURSE ANESTHETIST, CERTIFIED REGISTERED

## 2024-05-20 PROCEDURE — 59025 FETAL NON-STRESS TEST: CPT

## 2024-05-20 PROCEDURE — 86901 BLOOD TYPING SEROLOGIC RH(D): CPT | Performed by: OBSTETRICS & GYNECOLOGY

## 2024-05-20 PROCEDURE — 25010000002 CEFAZOLIN PER 500 MG: Performed by: OBSTETRICS & GYNECOLOGY

## 2024-05-20 PROCEDURE — 25010000002 BUPIVACAINE IN DEXTROSE 0.75-8.25 % SOLUTION: Performed by: NURSE ANESTHETIST, CERTIFIED REGISTERED

## 2024-05-20 PROCEDURE — 86850 RBC ANTIBODY SCREEN: CPT | Performed by: OBSTETRICS & GYNECOLOGY

## 2024-05-20 PROCEDURE — 86900 BLOOD TYPING SEROLOGIC ABO: CPT | Performed by: OBSTETRICS & GYNECOLOGY

## 2024-05-20 PROCEDURE — 80307 DRUG TEST PRSMV CHEM ANLYZR: CPT | Performed by: OBSTETRICS & GYNECOLOGY

## 2024-05-20 PROCEDURE — 25010000002 MORPHINE PER 10 MG: Performed by: NURSE ANESTHETIST, CERTIFIED REGISTERED

## 2024-05-20 PROCEDURE — C1765 ADHESION BARRIER: HCPCS | Performed by: OBSTETRICS & GYNECOLOGY

## 2024-05-20 PROCEDURE — 25010000002 METHYLERGONOVINE MALEATE PER 0.2 MG: Performed by: NURSE ANESTHETIST, CERTIFIED REGISTERED

## 2024-05-20 PROCEDURE — 59510 CESAREAN DELIVERY: CPT | Performed by: OBSTETRICS & GYNECOLOGY

## 2024-05-20 PROCEDURE — 25810000003 LACTATED RINGERS PER 1000 ML: Performed by: OBSTETRICS & GYNECOLOGY

## 2024-05-20 PROCEDURE — 25010000002 KETOROLAC TROMETHAMINE PER 15 MG: Performed by: OBSTETRICS & GYNECOLOGY

## 2024-05-20 DEVICE — ABSORBABLE ADHESION BARRIER
Type: IMPLANTABLE DEVICE | Site: UTERUS | Status: FUNCTIONAL
Brand: GYNECARE INTERCEED

## 2024-05-20 RX ORDER — SODIUM CHLORIDE, SODIUM LACTATE, POTASSIUM CHLORIDE, CALCIUM CHLORIDE 600; 310; 30; 20 MG/100ML; MG/100ML; MG/100ML; MG/100ML
125 INJECTION, SOLUTION INTRAVENOUS CONTINUOUS
Status: DISCONTINUED | OUTPATIENT
Start: 2024-05-20 | End: 2024-05-20

## 2024-05-20 RX ORDER — PROMETHAZINE HYDROCHLORIDE 12.5 MG/1
12.5 SUPPOSITORY RECTAL EVERY 6 HOURS PRN
Status: DISCONTINUED | OUTPATIENT
Start: 2024-05-20 | End: 2024-05-23 | Stop reason: HOSPADM

## 2024-05-20 RX ORDER — SODIUM CHLORIDE 9 MG/ML
40 INJECTION, SOLUTION INTRAVENOUS AS NEEDED
Status: DISCONTINUED | OUTPATIENT
Start: 2024-05-20 | End: 2024-05-20 | Stop reason: HOSPADM

## 2024-05-20 RX ORDER — BUPIVACAINE HYDROCHLORIDE 7.5 MG/ML
INJECTION, SOLUTION INTRASPINAL AS NEEDED
Status: DISCONTINUED | OUTPATIENT
Start: 2024-05-20 | End: 2024-05-20 | Stop reason: SURG

## 2024-05-20 RX ORDER — NALOXONE HCL 0.4 MG/ML
0.4 VIAL (ML) INJECTION
Status: ACTIVE | OUTPATIENT
Start: 2024-05-20 | End: 2024-05-21

## 2024-05-20 RX ORDER — SODIUM CHLORIDE 0.9 % (FLUSH) 0.9 %
1-10 SYRINGE (ML) INJECTION AS NEEDED
Status: DISCONTINUED | OUTPATIENT
Start: 2024-05-20 | End: 2024-05-23 | Stop reason: HOSPADM

## 2024-05-20 RX ORDER — OXYTOCIN/0.9 % SODIUM CHLORIDE 30/500 ML
125 PLASTIC BAG, INJECTION (ML) INTRAVENOUS ONCE AS NEEDED
Status: DISCONTINUED | OUTPATIENT
Start: 2024-05-20 | End: 2024-05-23 | Stop reason: HOSPADM

## 2024-05-20 RX ORDER — DIPHENHYDRAMINE HYDROCHLORIDE 50 MG/ML
25 INJECTION INTRAMUSCULAR; INTRAVENOUS ONCE AS NEEDED
Status: COMPLETED | OUTPATIENT
Start: 2024-05-20 | End: 2024-05-21

## 2024-05-20 RX ORDER — KETOROLAC TROMETHAMINE 15 MG/ML
15 INJECTION, SOLUTION INTRAMUSCULAR; INTRAVENOUS EVERY 6 HOURS
Status: COMPLETED | OUTPATIENT
Start: 2024-05-20 | End: 2024-05-21

## 2024-05-20 RX ORDER — HYDROCORTISONE 25 MG/G
1 CREAM TOPICAL AS NEEDED
Status: DISCONTINUED | OUTPATIENT
Start: 2024-05-20 | End: 2024-05-23 | Stop reason: HOSPADM

## 2024-05-20 RX ORDER — PRENATAL VIT/IRON FUM/FOLIC AC 27MG-0.8MG
1 TABLET ORAL DAILY
Status: DISCONTINUED | OUTPATIENT
Start: 2024-05-20 | End: 2024-05-23 | Stop reason: HOSPADM

## 2024-05-20 RX ORDER — FENTANYL CITRATE 50 UG/ML
INJECTION, SOLUTION INTRAMUSCULAR; INTRAVENOUS AS NEEDED
Status: DISCONTINUED | OUTPATIENT
Start: 2024-05-20 | End: 2024-05-20 | Stop reason: SURG

## 2024-05-20 RX ORDER — OXYCODONE HYDROCHLORIDE 5 MG/1
5 TABLET ORAL EVERY 4 HOURS PRN
Status: DISCONTINUED | OUTPATIENT
Start: 2024-05-20 | End: 2024-05-23 | Stop reason: HOSPADM

## 2024-05-20 RX ORDER — OXYTOCIN/0.9 % SODIUM CHLORIDE 30/500 ML
85 PLASTIC BAG, INJECTION (ML) INTRAVENOUS ONCE
Status: DISCONTINUED | OUTPATIENT
Start: 2024-05-20 | End: 2024-05-20 | Stop reason: HOSPADM

## 2024-05-20 RX ORDER — SODIUM CHLORIDE 0.9 % (FLUSH) 0.9 %
10 SYRINGE (ML) INJECTION EVERY 12 HOURS SCHEDULED
Status: DISCONTINUED | OUTPATIENT
Start: 2024-05-20 | End: 2024-05-20 | Stop reason: HOSPADM

## 2024-05-20 RX ORDER — ONDANSETRON 2 MG/ML
4 INJECTION INTRAMUSCULAR; INTRAVENOUS ONCE AS NEEDED
Status: ACTIVE | OUTPATIENT
Start: 2024-05-20 | End: 2024-05-21

## 2024-05-20 RX ORDER — CITRIC ACID/SODIUM CITRATE 334-500MG
30 SOLUTION, ORAL ORAL ONCE
Status: COMPLETED | OUTPATIENT
Start: 2024-05-20 | End: 2024-05-20

## 2024-05-20 RX ORDER — MORPHINE SULFATE 0.5 MG/ML
INJECTION, SOLUTION EPIDURAL; INTRATHECAL; INTRAVENOUS AS NEEDED
Status: DISCONTINUED | OUTPATIENT
Start: 2024-05-20 | End: 2024-05-20 | Stop reason: SURG

## 2024-05-20 RX ORDER — SODIUM CHLORIDE 9 MG/ML
40 INJECTION, SOLUTION INTRAVENOUS AS NEEDED
Status: DISCONTINUED | OUTPATIENT
Start: 2024-05-20 | End: 2024-05-23 | Stop reason: HOSPADM

## 2024-05-20 RX ORDER — ALUMINA, MAGNESIA, AND SIMETHICONE 2400; 2400; 240 MG/30ML; MG/30ML; MG/30ML
15 SUSPENSION ORAL EVERY 4 HOURS PRN
Status: DISCONTINUED | OUTPATIENT
Start: 2024-05-20 | End: 2024-05-23 | Stop reason: HOSPADM

## 2024-05-20 RX ORDER — SODIUM CHLORIDE 0.9 % (FLUSH) 0.9 %
10 SYRINGE (ML) INJECTION AS NEEDED
Status: DISCONTINUED | OUTPATIENT
Start: 2024-05-20 | End: 2024-05-20 | Stop reason: HOSPADM

## 2024-05-20 RX ORDER — ACETAMINOPHEN 500 MG
1000 TABLET ORAL ONCE
Status: DISCONTINUED | OUTPATIENT
Start: 2024-05-20 | End: 2024-05-20 | Stop reason: HOSPADM

## 2024-05-20 RX ORDER — OXYTOCIN/0.9 % SODIUM CHLORIDE 30/500 ML
650 PLASTIC BAG, INJECTION (ML) INTRAVENOUS ONCE
Status: DISCONTINUED | OUTPATIENT
Start: 2024-05-20 | End: 2024-05-20 | Stop reason: HOSPADM

## 2024-05-20 RX ORDER — ONDANSETRON 4 MG/1
4 TABLET, ORALLY DISINTEGRATING ORAL EVERY 8 HOURS PRN
Status: DISCONTINUED | OUTPATIENT
Start: 2024-05-20 | End: 2024-05-23 | Stop reason: HOSPADM

## 2024-05-20 RX ORDER — OXYCODONE HYDROCHLORIDE 10 MG/1
10 TABLET ORAL EVERY 4 HOURS PRN
Status: DISCONTINUED | OUTPATIENT
Start: 2024-05-20 | End: 2024-05-23 | Stop reason: HOSPADM

## 2024-05-20 RX ORDER — MISOPROSTOL 200 UG/1
800 TABLET ORAL AS NEEDED
Status: DISCONTINUED | OUTPATIENT
Start: 2024-05-20 | End: 2024-05-20 | Stop reason: HOSPADM

## 2024-05-20 RX ORDER — SODIUM CHLORIDE 0.9 % (FLUSH) 0.9 %
10 SYRINGE (ML) INJECTION EVERY 12 HOURS SCHEDULED
Status: DISCONTINUED | OUTPATIENT
Start: 2024-05-20 | End: 2024-05-23 | Stop reason: HOSPADM

## 2024-05-20 RX ORDER — METHYLERGONOVINE MALEATE 0.2 MG/ML
200 INJECTION INTRAVENOUS AS NEEDED
Status: DISCONTINUED | OUTPATIENT
Start: 2024-05-20 | End: 2024-05-23 | Stop reason: HOSPADM

## 2024-05-20 RX ORDER — METHYLERGONOVINE MALEATE 0.2 MG/ML
INJECTION INTRAVENOUS AS NEEDED
Status: DISCONTINUED | OUTPATIENT
Start: 2024-05-20 | End: 2024-05-20 | Stop reason: SURG

## 2024-05-20 RX ORDER — MIDAZOLAM HYDROCHLORIDE 1 MG/ML
INJECTION INTRAMUSCULAR; INTRAVENOUS AS NEEDED
Status: DISCONTINUED | OUTPATIENT
Start: 2024-05-20 | End: 2024-05-20 | Stop reason: SURG

## 2024-05-20 RX ORDER — DOCUSATE SODIUM 100 MG/1
100 CAPSULE, LIQUID FILLED ORAL 2 TIMES DAILY PRN
Status: DISCONTINUED | OUTPATIENT
Start: 2024-05-20 | End: 2024-05-23 | Stop reason: HOSPADM

## 2024-05-20 RX ORDER — OXYTOCIN/0.9 % SODIUM CHLORIDE 30/500 ML
PLASTIC BAG, INJECTION (ML) INTRAVENOUS AS NEEDED
Status: DISCONTINUED | OUTPATIENT
Start: 2024-05-20 | End: 2024-05-20 | Stop reason: SURG

## 2024-05-20 RX ORDER — DIPHENHYDRAMINE HCL 25 MG
25 CAPSULE ORAL EVERY 4 HOURS PRN
Status: COMPLETED | OUTPATIENT
Start: 2024-05-20 | End: 2024-05-21

## 2024-05-20 RX ORDER — MISOPROSTOL 200 UG/1
600 TABLET ORAL AS NEEDED
Status: DISCONTINUED | OUTPATIENT
Start: 2024-05-20 | End: 2024-05-23 | Stop reason: HOSPADM

## 2024-05-20 RX ORDER — SIMETHICONE 80 MG
80 TABLET,CHEWABLE ORAL 4 TIMES DAILY PRN
Status: DISCONTINUED | OUTPATIENT
Start: 2024-05-20 | End: 2024-05-23 | Stop reason: HOSPADM

## 2024-05-20 RX ORDER — METHYLERGONOVINE MALEATE 0.2 MG/ML
200 INJECTION INTRAVENOUS ONCE AS NEEDED
Status: DISCONTINUED | OUTPATIENT
Start: 2024-05-20 | End: 2024-05-20 | Stop reason: HOSPADM

## 2024-05-20 RX ORDER — KETOROLAC TROMETHAMINE 30 MG/ML
30 INJECTION, SOLUTION INTRAMUSCULAR; INTRAVENOUS ONCE
Status: COMPLETED | OUTPATIENT
Start: 2024-05-20 | End: 2024-05-20

## 2024-05-20 RX ORDER — CALCIUM CARBONATE 500 MG/1
1 TABLET, CHEWABLE ORAL EVERY 4 HOURS PRN
Status: DISCONTINUED | OUTPATIENT
Start: 2024-05-20 | End: 2024-05-23 | Stop reason: HOSPADM

## 2024-05-20 RX ORDER — ACETAMINOPHEN 500 MG
1000 TABLET ORAL EVERY 6 HOURS
Status: COMPLETED | OUTPATIENT
Start: 2024-05-20 | End: 2024-05-21

## 2024-05-20 RX ORDER — CARBOPROST TROMETHAMINE 250 UG/ML
250 INJECTION, SOLUTION INTRAMUSCULAR AS NEEDED
Status: DISCONTINUED | OUTPATIENT
Start: 2024-05-20 | End: 2024-05-23 | Stop reason: HOSPADM

## 2024-05-20 RX ORDER — CARBOPROST TROMETHAMINE 250 UG/ML
250 INJECTION, SOLUTION INTRAMUSCULAR AS NEEDED
Status: DISCONTINUED | OUTPATIENT
Start: 2024-05-20 | End: 2024-05-20 | Stop reason: HOSPADM

## 2024-05-20 RX ORDER — LIDOCAINE HYDROCHLORIDE 10 MG/ML
0.5 INJECTION, SOLUTION EPIDURAL; INFILTRATION; INTRACAUDAL; PERINEURAL ONCE AS NEEDED
Status: DISCONTINUED | OUTPATIENT
Start: 2024-05-20 | End: 2024-05-20 | Stop reason: HOSPADM

## 2024-05-20 RX ORDER — METOCLOPRAMIDE HYDROCHLORIDE 5 MG/ML
INJECTION INTRAMUSCULAR; INTRAVENOUS AS NEEDED
Status: DISCONTINUED | OUTPATIENT
Start: 2024-05-20 | End: 2024-05-20 | Stop reason: SURG

## 2024-05-20 RX ORDER — ONDANSETRON 2 MG/ML
INJECTION INTRAMUSCULAR; INTRAVENOUS AS NEEDED
Status: DISCONTINUED | OUTPATIENT
Start: 2024-05-20 | End: 2024-05-20 | Stop reason: SURG

## 2024-05-20 RX ORDER — ACETAMINOPHEN 500 MG
500 TABLET ORAL EVERY 6 HOURS PRN
COMMUNITY

## 2024-05-20 RX ORDER — IBUPROFEN 600 MG/1
600 TABLET ORAL EVERY 6 HOURS
Status: DISCONTINUED | OUTPATIENT
Start: 2024-05-21 | End: 2024-05-23 | Stop reason: HOSPADM

## 2024-05-20 RX ORDER — ACETAMINOPHEN 325 MG/1
650 TABLET ORAL EVERY 6 HOURS
Status: DISCONTINUED | OUTPATIENT
Start: 2024-05-21 | End: 2024-05-23 | Stop reason: HOSPADM

## 2024-05-20 RX ORDER — PROMETHAZINE HYDROCHLORIDE 25 MG/1
25 TABLET ORAL EVERY 6 HOURS PRN
Status: DISCONTINUED | OUTPATIENT
Start: 2024-05-20 | End: 2024-05-23 | Stop reason: HOSPADM

## 2024-05-20 RX ORDER — FAMOTIDINE 10 MG/ML
INJECTION, SOLUTION INTRAVENOUS AS NEEDED
Status: DISCONTINUED | OUTPATIENT
Start: 2024-05-20 | End: 2024-05-20 | Stop reason: SURG

## 2024-05-20 RX ORDER — DIPHENHYDRAMINE HYDROCHLORIDE 50 MG/ML
25 INJECTION INTRAMUSCULAR; INTRAVENOUS EVERY 4 HOURS PRN
Status: COMPLETED | OUTPATIENT
Start: 2024-05-20 | End: 2024-05-21

## 2024-05-20 RX ADMIN — METHYLERGONOVINE MALEATE 200 MCG: 0.2 INJECTION, SOLUTION INTRAMUSCULAR; INTRAVENOUS at 08:15

## 2024-05-20 RX ADMIN — KETOROLAC TROMETHAMINE 15 MG: 15 INJECTION, SOLUTION INTRAMUSCULAR; INTRAVENOUS at 21:02

## 2024-05-20 RX ADMIN — SODIUM CHLORIDE, POTASSIUM CHLORIDE, SODIUM LACTATE AND CALCIUM CHLORIDE 125 ML/HR: 600; 310; 30; 20 INJECTION, SOLUTION INTRAVENOUS at 09:41

## 2024-05-20 RX ADMIN — METOCLOPRAMIDE 10 MG: 5 INJECTION, SOLUTION INTRAMUSCULAR; INTRAVENOUS at 07:44

## 2024-05-20 RX ADMIN — SODIUM CHLORIDE, POTASSIUM CHLORIDE, SODIUM LACTATE AND CALCIUM CHLORIDE 1000 ML: 600; 310; 30; 20 INJECTION, SOLUTION INTRAVENOUS at 07:00

## 2024-05-20 RX ADMIN — DOCUSATE SODIUM 100 MG: 100 CAPSULE, LIQUID FILLED ORAL at 18:17

## 2024-05-20 RX ADMIN — MIDAZOLAM HYDROCHLORIDE 1 MG: 1 INJECTION, SOLUTION INTRAMUSCULAR; INTRAVENOUS at 07:44

## 2024-05-20 RX ADMIN — Medication 10 ML: at 21:03

## 2024-05-20 RX ADMIN — KETOROLAC TROMETHAMINE 15 MG: 15 INJECTION, SOLUTION INTRAMUSCULAR; INTRAVENOUS at 15:07

## 2024-05-20 RX ADMIN — MORPHINE SULFATE 150 MCG: 0.5 INJECTION, SOLUTION EPIDURAL; INTRATHECAL; INTRAVENOUS at 07:50

## 2024-05-20 RX ADMIN — BUPIVACAINE HYDROCHLORIDE IN DEXTROSE 1.8 ML: 7.5 INJECTION, SOLUTION SUBARACHNOID at 07:50

## 2024-05-20 RX ADMIN — ONDANSETRON 4 MG: 2 INJECTION INTRAMUSCULAR; INTRAVENOUS at 07:44

## 2024-05-20 RX ADMIN — KETOROLAC TROMETHAMINE 30 MG: 30 INJECTION, SOLUTION INTRAMUSCULAR; INTRAVENOUS at 10:21

## 2024-05-20 RX ADMIN — SODIUM CHLORIDE 2 G: 900 INJECTION INTRAVENOUS at 07:35

## 2024-05-20 RX ADMIN — SODIUM CHLORIDE, POTASSIUM CHLORIDE, SODIUM LACTATE AND CALCIUM CHLORIDE 1000 ML/HR: 600; 310; 30; 20 INJECTION, SOLUTION INTRAVENOUS at 07:35

## 2024-05-20 RX ADMIN — FAMOTIDINE 20 MG: 10 INJECTION, SOLUTION INTRAVENOUS at 07:44

## 2024-05-20 RX ADMIN — ACETAMINOPHEN 1000 MG: 500 TABLET ORAL at 12:05

## 2024-05-20 RX ADMIN — FENTANYL CITRATE 20 MCG: 50 INJECTION, SOLUTION INTRAMUSCULAR; INTRAVENOUS at 07:50

## 2024-05-20 RX ADMIN — SODIUM CHLORIDE, POTASSIUM CHLORIDE, SODIUM LACTATE AND CALCIUM CHLORIDE: 600; 310; 30; 20 INJECTION, SOLUTION INTRAVENOUS at 08:08

## 2024-05-20 RX ADMIN — Medication 30 ML: at 07:39

## 2024-05-20 RX ADMIN — Medication 500 ML: at 08:22

## 2024-05-20 RX ADMIN — ACETAMINOPHEN 1000 MG: 500 TABLET ORAL at 18:15

## 2024-05-20 NOTE — LACTATION NOTE
05/20/24 1237   Maternal Information   Date of Referral 05/20/24   Person Making Referral lactation consultant   Maternal Reason for Referral   (courtesy visit for new delivery. Hx: BF 1st child for 6mos without any problems. Pt states infant has already had a few good feedings.)   Maternal Assessment   Left Nipple Symptoms   (no adverse report from patient.)   Maternal Infant Feeding   Maternal Emotional State independent  (Offered to help get infant to breast but pt kindly declined. Encouraged her to call lactation services if she has any problems)   Latch Assistance none needed   Support Person Involvement actively supporting mother   Milk Expression/Equipment   Breast Pump Type double electric, personal  (Pt allowed me to give her a Spectra pump from [employer] since her pump was 3yrs old.)

## 2024-05-20 NOTE — ANESTHESIA PROCEDURE NOTES
Spinal Block      Patient reassessed immediately prior to procedure    Patient location during procedure: OR  Indication:at surgeon's request  Performed By  CRNA/WILIAN: Bernadette Reddy CRNA  Preanesthetic Checklist  Completed: patient identified, IV checked, site marked, risks and benefits discussed, surgical consent, monitors and equipment checked, pre-op evaluation and timeout performed  Spinal Block Prep:  Patient Position:sitting  Sterile Tech:cap, gloves, mask and sterile barriers  Prep:Betadine  Patient Monitoring:blood pressure monitoring, continuous pulse oximetry and EKG    Spinal Block Procedure  Approach:midline  Guidance:palpation technique  Location:L3-L4  Needle Type:Jimmy  Needle Gauge:25 G  Placement of Spinal needle event:cerebrospinal fluid aspirated  Paresthesia: no  Fluid Appearance:clear     Post Assessment  Patient Tolerance:patient tolerated the procedure well with no apparent complications  Complications no

## 2024-05-20 NOTE — OP NOTE
[facility]   Section Operative Note    Pre-Operative Dx:   1.  IUP at 39w1d  weeks     2. Prior         Postoperative dx:    1.  Same     Procedure: Procedure(s):   SECTION REPEAT   Surgeon: Kavitha Paniagua MD    Assistant: Surgeon(s):  Kavitha Paniagua MD        Anesthesia:     EBL:   mls.  344  mls.         IV Fluids: 1500 mls.   UOP: 400 mls.       Antibiotics: cefazolin (Ancef)     Infant:            Gender: male  infant    Weight: 3355 g (7 lb 6.3 oz)     Apgars: 9  @ 1 minute /     9  @ 5 minutes    Fetal presentation: cephalic   Amniotic fluid: Clear      Complications:   None      Disposition:   Mother to Mother Baby/Postpartum  in stable condition currently.   Baby to NBN  in stable condition currently.       Procedure:   The patient was taken to the operating room where spinal anesthesia was placed, and she was placed in supine position with a leftward tilt. Sequential compression devices on bilateral lower extremities and a Ariza catheter placed in her bladder. After being prepped and draped in a normal sterile fashion, an elliptical incision was made around her prior Pfannenstiel incision.  The scar was removed.  The incision was then taken down to the level of the fascia using the Bovie. The fascia was incised in the midline and extended laterally. The superior edge of the fascia was grasped with Kocher clamps, elevated and the rectus muscle dissected off. In a similar fashion, the inferior edge of the fascia was grasped with Kocher clamps, elevated and the rectus muscles dissected off. The rectus muscles were bisected in the midline. The peritoneum was identified, grasped and entered into sharply using Metzenbaum scissors. This incision was extended superiorly and inferiorly with good visualization of the bladder. Bladder blade was placed. A bladder flap was created. A low transverse uterine incision was made with a scalpel and extended laterally. Amniotomy with  clear fluid occurred at the time of hysterotomy. The head was brought to the uterine incision, and using fundal pressure, the head delivered without complication. Nose and mouth were bulb suctioned.  Shoulders and body were to follow.  After 1 minute the cord was clamped x2 and cut. Infant was handed to the awaiting pediatric team.  Cord blood was obtained. The placenta was manually extracted. The uterus was exteriorized and cleared of all clot and debris and the hysterotomy site was closed with a 1-0 chromic in a running locked fashion starting at the left angle and moving towards the right. Copious irrigation behind the uterus ensued. The uterus was returned to the abdominal cavity. Paracolic gutters were cleared of all clot and debris. The hysterotomy site was noted to be hemostatic as well as the bladder flap and Interceed was placed over this. The peritoneum was reapproximated using a 2-0 chromic in a running fashion starting superiorly and moving inferiorly. Irrigation over the rectus muscles then occurred with Bovie cauterization of any bleeding sites. The fascia was reapproximated using a 0 Vicryl in a running fashion starting at the left angle and moving towards the right.  The subcutaneous fat layer was hemostatic and closed in an interrupted fashion with 2-0 Plain.  The skin was reapproximated with staples.  All sponge, lap, and needle counts were correct x2. The patient was taken to the recovery room in stable condition.             Kavitha Paniagua MD  5/20/2024  08:43 EDT

## 2024-05-20 NOTE — ANESTHESIA PREPROCEDURE EVALUATION
Anesthesia Evaluation     NPO Solid Status: > 8 hours  NPO Liquid Status: > 8 hours           Airway   Dental      Pulmonary    Cardiovascular         Neuro/Psych  (+) psychiatric history Anxiety and Depression  GI/Hepatic/Renal/Endo    (+) GERD, renal disease- stones    Musculoskeletal         ROS comment: Ankylosing spondylitis  Abdominal    Substance History      OB/GYN    (+) Pregnant    Comment: Previous C/S      Other                    Anesthesia Plan    ASA 3     spinal and ITN       Anesthetic plan, risks, benefits, and alternatives have been provided, discussed and informed consent has been obtained with: patient.    CODE STATUS:    Level Of Support Discussed With: Patient  Code Status (Patient has no pulse and is not breathing): CPR (Attempt to Resuscitate)  Medical Interventions (Patient has pulse or is breathing): Full Support

## 2024-05-20 NOTE — PLAN OF CARE
Problem: Adult Inpatient Plan of Care  Goal: Absence of Hospital-Acquired Illness or Injury  Intervention: Prevent and Manage VTE (Venous Thromboembolism) Risk  Recent Flowsheet Documentation  Taken 2024 0837 by Miesha Houser RN  VTE Prevention/Management:   bilateral   sequential compression devices on     Problem: Bleeding ( Delivery)  Goal: Bleeding is Controlled  Outcome: Met     Problem: Change in Fetal Wellbeing ( Delivery)  Goal: Stable Fetal Wellbeing  Outcome: Met     Problem: Infection ( Delivery)  Goal: Absence of Infection Signs and Symptoms  Outcome: Met     Problem: Respiratory Compromise ( Delivery)  Goal: Effective Oxygenation and Ventilation  Outcome: Met   Goal Outcome Evaluation:

## 2024-05-20 NOTE — ANESTHESIA POSTPROCEDURE EVALUATION
Patient: Alyse Bojorquez    Procedure Summary       Date: 24 Room / Location: [facility] LABOR DELIVERY   [facility] LABOR [facility]IVERY    Anesthesia Start: 743 Anesthesia Stop: 839    Procedure:  SECTION REPEAT (Abdomen) Diagnosis:     Surgeons: Kavitha Paniagua MD Provider: Lola Saleh DO    Anesthesia Type: spinal, ITN ASA Status: 3            Anesthesia Type: spinal, ITN    Vitals  Vitals Value Taken Time   BP 99/48    Temp 97.6    Pulse 88 24   Resp 16    SpO2 99 % 24   Vitals shown include unfiled device data.        Post Anesthesia Care and Evaluation    Patient location during evaluation: bedside  Patient participation: complete - patient participated  Level of consciousness: awake and alert  Pain management: adequate    Airway patency: patent  Anesthetic complications: No anesthetic complications    Cardiovascular status: acceptable  Respiratory status: acceptable  Hydration status: acceptable

## 2024-05-20 NOTE — H&P
History and Physical:    Subjective     Chief Complaint   Patient presents with    Scheduled              Alyse Bojorquez is a 33 y.o. year old  with an Estimated Date of Delivery: 24 currently at 39w1d presenting with prior  section desires repeat.    Prenatal care has been with Kavitha Paniagua MD.  It has been significant for previous C/S - (desires repeat ).      Review of Systems   Constitutional: Negative.    HENT: Negative.     Respiratory: Negative.     Cardiovascular: Negative.    Gastrointestinal: Negative.    Genitourinary: Negative.    Musculoskeletal: Negative.    Skin: Negative.    Allergic/Immunologic: Negative.    Neurological: Negative.    Hematological: Negative.    Psychiatric/Behavioral: Negative.             Past Medical History:   Diagnosis Date    Anal fissure     Ankylosing spondylitis     Anxiety     Depression     GERD (gastroesophageal reflux disease)     Internal hemorrhoid     Kidney stone     passed them    Papanicolaou smear 2018    WNL    Pregnancy 2024     Past Surgical History:   Procedure Laterality Date     SECTION N/A 2021    Procedure:  SECTION PRIMARY;  Surgeon: Kavitha Paniagua MD;  Location: [facility] LABOR DELIVERY;  Service: Obstetrics/Gynecology;  Laterality: N/A;    OTHER SURGICAL HISTORY      Hemorrhoid surgery    SIGMOIDOSCOPY      WISDOM TOOTH EXTRACTION       Family History   Problem Relation Age of Onset    Heart disease Other     Stroke Other      Social History     Tobacco Use    Smoking status: Never    Smokeless tobacco: Never   Vaping Use    Vaping status: Never Used   Substance Use Topics    Alcohol use: Yes     Alcohol/week: 1.0 standard drink of alcohol     Types: 1 Glasses of wine per week     Comment: OCCASIONAL    Drug use: No     Medications Prior to Admission   Medication Sig Dispense Refill Last Dose    acetaminophen (TYLENOL) 500 MG tablet Take 1 tablet by mouth Every 6  "(Six) Hours As Needed for Mild Pain.   2024 at 0345    doxylamine (Unisom SleepTabs) 25 MG tablet Unisom (doxylamine) 25 mg tablet   Take by oral route.   2024    famotidine (PEPCID) 20 MG tablet Take 1 tablet by mouth 2 (Two) Times a Day.   2024    Fiber Complete tablet Take  by mouth.   Past Month    MAGNESIUM PO Take  by mouth.   Past Week    prenatal vitamin (prenatal, CLASSIC, vitamin) tablet Take 1 tablet by mouth Daily.   2024    propranolol (INDERAL) 10 MG tablet Take 1 tablet by mouth As Needed (flights).   More than a month     Allergies:  Vancomycin, Amoxicillin, Bactrim [sulfamethoxazole-trimethoprim], and Penicillins  OB History    Para Term  AB Living   2 1 1 0 0 1   SAB IAB Ectopic Molar Multiple Live Births   0 0 0 0 0 1      # Outcome Date GA Lbr Jose/2nd Weight Sex Type Anes PTL Lv   2 Current            1 Term 21 39w0d  3120 g (6 lb 14.1 oz) M CS-LTranv Spinal N DRAGAN             Objective     /88 (BP Location: Left arm, Patient Position: Lying)   Pulse 89   Temp 98.1 °F (36.7 °C) (Oral)   Resp 16   Ht 175.3 cm (69\")   Wt 73 kg (161 lb)   LMP 2023   BMI 23.78 kg/m²     Physical Exam    General:  No acute distress   Lung: Clear to auscultation bilaterally, no wheezing, clear at bases   Heart: Regular rate and rhythm, no murmurs    Abdomen: Gravid, nontender   Extremities: 2+ DTR's bilaterally, no edema   FHT's: reactive    Cervix: Deferred   Northrop: Contraction are none           Lab Review   External Prenatal Results       Pregnancy Outside Results - Transcribed From Office Records - See Scanned Records For Details       Test Value Date Time    ABO  O  10/16/23 0959    Rh  Positive  10/16/23 0959    Antibody Screen  Negative  24       Negative  10/16/23 0959    Varicella IgG       Rubella  12.30 index 10/16/23 0959    Hgb  13.2 g/dL 24 0839       12.3 g/dL 24 0933       12.9 g/dL 10/16/23 0959    Hct  39.8 % 24 0839 "       38.0 % 03/07/24 0933       38.6 % 10/16/23 0959    Glucose Fasting GTT       Glucose Tolerance Test 1 hour       Glucose Tolerance Test 3 hour       Gonorrhea (discrete)  Negative  10/16/23 0959    Chlamydia (discrete)  Negative  10/16/23 0959    RPR  Non Reactive  03/07/24 0933       Non Reactive  10/16/23 0959    VDRL       Syphilis Antibody       HBsAg  Negative  10/16/23 0959    Herpes Simplex Virus PCR       Herpes Simplex VIrus Culture       HIV  Non Reactive  10/16/23 0959    Hep C RNA Quant PCR       Hep C Antibody  Non Reactive  10/16/23 0959    AFP       Group B Strep       GBS Susceptibility to Clindamycin       GBS Susceptibility to Erythromycin       Fetal Fibronectin       Genetic Testing, Maternal Blood                 Drug Screening       Test Value Date Time    NIPT        Urine Drug Screen       Amphetamine Screen       Barbiturate Screen       Benzodiazepine Screen       Methadone Screen       Phencyclidine Screen       Opiates Screen       THC Screen       Cocaine Screen       Propoxyphene Screen       Buprenorphine Screen       Methamphetamine Screen       Oxycodone Screen                 Legend    ^: Historical                                Lab Results   Component Value Date    ALKPHOS 72 01/07/2018    ALT 13 01/07/2018    AST 18 01/07/2018    CREATININE 0.90 01/07/2018    BILITOT 0.6 01/07/2018     Lab Results   Component Value Date    WBC 12.83 (H) 05/17/2024    HGB 13.2 05/17/2024    HCT 39.8 05/17/2024    MCV 85.0 05/17/2024     05/17/2024        Results for orders placed in visit on 05/24/21     Ob Follow Up Transabdominal Approach    Narrative  PAT NAME: EREN ROBERTSON  [facility] REC#: [id number]  BIRTH DA: [id number]  PAT GEND: F  ACCOUNT#: [id number]  PAT TYPE: O  EXAM MISTI: <OBR.7.1>[id number]</OBR.7.1><OBR.7.1>[id number]</OBR.7.1>  REF PHYS HYACINTH SELF  ACCESSION [id number]      Indication  ========    Follow-up evaluation for fetal growth. S<D      Comparison  Studies  =================    The findings of this study are compared to the prior ultrasound study dated 21      Method  =======    Voluson E6, Transabdominal ultrasound examination      Pregnancy  =========    Giron pregnancy. Number of fetuses: 1      Dating  ======    LMP on: 2020  GA by LMP 37 w + 2 d  ERNIE by LMP: 2021  Method of dating: based on stated ERNIE  GA by prior assessment 37 w + 2 d  ERNIE by prior assessment: 2021  Ultrasound examination on: 2021  GA by U/S based upon: AC, BPD, Femur, HC  GA by U/S 36 w + 4 d  ERNIE by U/S: 2021  Previous dating: based on stated ERNIE, selected on 2021  Agreed ERNIE of previous datin2021  Assigned: based on stated ERNIE, selected on 2021  Assigned GA 37 w + 2 d  Assigned ERNIE: 2021  Pregnancy length 280 d      General Evaluation  ================    Cardiac activity present.  bpm.  Fetal movements visualized.  Presentation cephalic.  Placenta Placental site: anterior. GRADE 3.  Umbilical cord Cord vessels: 3 vessel cord.  Amniotic fluid Amount of AF: normal. MVP 6.1 cm. MYRNA 15.0 cm. Q1 5.5 cm, Q2 6.1 cm, Q3 3.4 cm, Q4 0.0 cm.      Biophysical Profile  ===============    2: Fetal breathing movements  2: Gross body movements  2: Fetal tone  2: Amniotic fluid volume   Biophysical profile score      Fetal Biometry  ============    Standard  BPD 94.2 mm  38w 3d        89%        Hadlock    .5 mm  38w 2d        61%        Олег    .2 mm  37w 3d        29%        Hadlock    .1 mm  34w 5d        6%        Hadlock    Femur 69.4 mm  35w 4d        13%        Hadlock    Humerus 61.1 mm  35w 2d        31%        Олег    HC / AC 1.07    EFW 2,728 g  21%        Vik    EFW (lb) 6 lb  EFW (oz) 0 oz  EFW by: Hadlock (BPD-HC-AC-FL)  Other: An ultrasound for fetal weight has a margin of error of up to twenty percent.  Extended   7.0 mm  Head / Face / Neck  Cephalic index 0.84  69%         Nicolaides    Extremities / Bony Struc  FL / BPD 0.74    FL / HC 0.21    FL / AC 0.23    Other Structures   bpm      Fetal Anatomy  ============    Lateral ventricles: Appears normal  Cavum septi pellucidi: Appears normal  Lips: Appear normal  Profile: Appears normal  Nose: Appears normal  4-chamber view: Appears normal  RVOT view: Appears normal  LVOT view: Appears normal  Stomach: Appears normal  Kidneys: Appears normal  Bladder: Appears normal  Gender: male  Wants to know gender: yes      Impression  ==========    BPP 8 out of 8. Male fetus in vertex position with fetal heart rate of 151. Normal fluid volume. Estimated fetal weight was 6 pounds even which is 21st percentile. Of note  AC is 6 percentile.      Recommendation  ===============    Follow-up as clinically indicated.      Indication  ========    Follow-up evaluation for fetal growth. S<D      Comparison Studies  =================    The findings of this study are compared to the prior ultrasound study dated 21      Method  =======    Voluson E6, Transabdominal ultrasound examination      Pregnancy  =========    Giron pregnancy. Number of fetuses: 1      Dating  ======    LMP on: 2020  GA by LMP 37 w + 2 d  ERNIE by LMP: 2021  Method of dating: based on stated ERNIE  GA by prior assessment 37 w + 2 d  ERNIE by prior assessment: 2021  Ultrasound examination on: 2021  GA by U/S based upon: AC, BPD, Femur, HC  GA by U/S 36 w + 4 d  ERNIE by U/S: 2021  Previous dating: based on stated ERNIE, selected on 2021  Agreed ERNIE of previous datin2021  Assigned: based on stated ERNIE, selected on 2021  Assigned GA 37 w + 2 d  Assigned ERNIE: 2021  Pregnancy length 280 d      General Evaluation  ================    Cardiac activity present.  bpm.  Fetal movements visualized.  Presentation cephalic.  Placenta Placental site: anterior. GRADE 3.  Umbilical cord Cord vessels: 3 vessel cord.  Amniotic fluid Amount of  AF: normal. MVP 6.1 cm. MYRNA 15.0 cm. Q1 5.5 cm, Q2 6.1 cm, Q3 3.4 cm, Q4 0.0 cm.      Biophysical Profile  ===============    2: Fetal breathing movements  2: Gross body movements  2: Fetal tone  2: Amniotic fluid volume   Biophysical profile score      Fetal Biometry  ============    Standard  BPD 94.2 mm  38w 3d        89%        Hadlock    .5 mm  38w 2d        61%        Олег    .2 mm  37w 3d        29%        Hadlock    .1 mm  34w 5d        6%        Hadlock    Femur 69.4 mm  35w 4d        13%        Hadlock    Humerus 61.1 mm  35w 2d        31%        Олег    HC / AC 1.07    EFW 2,728 g  21%        Vik    EFW (lb) 6 lb  EFW (oz) 0 oz  EFW by: Hadlock (BPD-HC-AC-FL)  Other: An ultrasound for fetal weight has a margin of error of up to twenty percent.  Extended   7.0 mm  Head / Face / Neck  Cephalic index 0.84  69%        Nicolaides    Extremities / Bony Struc  FL / BPD 0.74    FL / HC 0.21    FL / AC 0.23    Other Structures   bpm      Fetal Anatomy  ============    Lateral ventricles: Appears normal  Cavum septi pellucidi: Appears normal  Lips: Appear normal  Profile: Appears normal  Nose: Appears normal  4-chamber view: Appears normal  RVOT view: Appears normal  LVOT view: Appears normal  Stomach: Appears normal  Kidneys: Appears normal  Bladder: Appears normal  Gender: male  Wants to know gender: yes      Impression  ==========    BPP 8 out of 8. Male fetus in vertex position with fetal heart rate of 151. Normal fluid volume. Estimated fetal weight was 6 pounds even which is 21st percentile. Of note  AC is 6 percentile.      Recommendation  ===============    Follow-up as clinically indicated.      [Narrative TRUNCATED]              Patient Active Problem List    Diagnosis Date Noted    Pregnancy 2024    Positive GBS test 10/20/2023     Note Last Updated: 10/20/2023     In urine and new OB visit.  Treated.      Prenatal care 10/16/2023    History of   section 2021     Note Last Updated: 2024-C-section at 39 weeks for nonreassuring fetal wellbeing baby boy weighing 6 pounds 14 ounces named will    Repeat scheduled 2024 at 7:30 AM.- orders in      External hemorrhoids 2020     Note Last Updated: 2020     Followed by Dr. Bwoden.           Assessment & Plan     ASSESSMENT  IUP at 39w1d  scheduled  section   3. GBBS positive    PLAN  Admit to labor and delivery   2.   Proceed with repeat  section         Kavihta Paniagua MD  2024@

## 2024-05-21 ENCOUNTER — TELEPHONE (OUTPATIENT)
Dept: OBSTETRICS AND GYNECOLOGY | Facility: CLINIC | Age: 34
End: 2024-05-21
Payer: COMMERCIAL

## 2024-05-21 LAB
BASOPHILS # BLD AUTO: 0.03 10*3/MM3 (ref 0–0.2)
BASOPHILS NFR BLD AUTO: 0.3 % (ref 0–1.5)
DEPRECATED RDW RBC AUTO: 40.9 FL (ref 37–54)
EOSINOPHIL # BLD AUTO: 0.03 10*3/MM3 (ref 0–0.4)
EOSINOPHIL NFR BLD AUTO: 0.3 % (ref 0.3–6.2)
ERYTHROCYTE [DISTWIDTH] IN BLOOD BY AUTOMATED COUNT: 13.1 % (ref 12.3–15.4)
HCT VFR BLD AUTO: 38 % (ref 34–46.6)
HGB BLD-MCNC: 12.7 G/DL (ref 12–15.9)
IMM GRANULOCYTES # BLD AUTO: 0.04 10*3/MM3 (ref 0–0.05)
IMM GRANULOCYTES NFR BLD AUTO: 0.4 % (ref 0–0.5)
LYMPHOCYTES # BLD AUTO: 1.33 10*3/MM3 (ref 0.7–3.1)
LYMPHOCYTES NFR BLD AUTO: 12.9 % (ref 19.6–45.3)
MCH RBC QN AUTO: 28.9 PG (ref 26.6–33)
MCHC RBC AUTO-ENTMCNC: 33.4 G/DL (ref 31.5–35.7)
MCV RBC AUTO: 86.4 FL (ref 79–97)
MONOCYTES # BLD AUTO: 0.55 10*3/MM3 (ref 0.1–0.9)
MONOCYTES NFR BLD AUTO: 5.3 % (ref 5–12)
NEUTROPHILS NFR BLD AUTO: 8.34 10*3/MM3 (ref 1.7–7)
NEUTROPHILS NFR BLD AUTO: 80.8 % (ref 42.7–76)
NRBC BLD AUTO-RTO: 0 /100 WBC (ref 0–0.2)
PLATELET # BLD AUTO: 126 10*3/MM3 (ref 140–450)
PMV BLD AUTO: 10.1 FL (ref 6–12)
RBC # BLD AUTO: 4.4 10*6/MM3 (ref 3.77–5.28)
WBC NRBC COR # BLD AUTO: 10.32 10*3/MM3 (ref 3.4–10.8)

## 2024-05-21 PROCEDURE — 85025 COMPLETE CBC W/AUTO DIFF WBC: CPT | Performed by: OBSTETRICS & GYNECOLOGY

## 2024-05-21 PROCEDURE — 25010000002 KETOROLAC TROMETHAMINE PER 15 MG: Performed by: OBSTETRICS & GYNECOLOGY

## 2024-05-21 PROCEDURE — 63710000001 DIPHENHYDRAMINE PER 50 MG: Performed by: NURSE ANESTHETIST, CERTIFIED REGISTERED

## 2024-05-21 RX ADMIN — ACETAMINOPHEN 650 MG: 325 TABLET ORAL at 13:22

## 2024-05-21 RX ADMIN — DOCUSATE SODIUM 100 MG: 100 CAPSULE, LIQUID FILLED ORAL at 10:10

## 2024-05-21 RX ADMIN — IBUPROFEN 600 MG: 600 TABLET, FILM COATED ORAL at 22:02

## 2024-05-21 RX ADMIN — SIMETHICONE 80 MG: 80 TABLET, CHEWABLE ORAL at 22:02

## 2024-05-21 RX ADMIN — Medication 10 ML: at 10:09

## 2024-05-21 RX ADMIN — ACETAMINOPHEN 1000 MG: 500 TABLET ORAL at 00:03

## 2024-05-21 RX ADMIN — KETOROLAC TROMETHAMINE 15 MG: 15 INJECTION, SOLUTION INTRAMUSCULAR; INTRAVENOUS at 03:37

## 2024-05-21 RX ADMIN — ACETAMINOPHEN 1000 MG: 500 TABLET ORAL at 06:37

## 2024-05-21 RX ADMIN — SIMETHICONE 80 MG: 80 TABLET, CHEWABLE ORAL at 10:09

## 2024-05-21 RX ADMIN — ACETAMINOPHEN 650 MG: 325 TABLET ORAL at 18:23

## 2024-05-21 RX ADMIN — PRENATAL VITAMINS-IRON FUMARATE 27 MG IRON-FOLIC ACID 0.8 MG TABLET 1 TABLET: at 10:09

## 2024-05-21 RX ADMIN — KETOROLAC TROMETHAMINE 15 MG: 15 INJECTION, SOLUTION INTRAMUSCULAR; INTRAVENOUS at 10:09

## 2024-05-21 RX ADMIN — DIPHENHYDRAMINE HYDROCHLORIDE 25 MG: 25 CAPSULE ORAL at 00:07

## 2024-05-21 RX ADMIN — IBUPROFEN 600 MG: 600 TABLET, FILM COATED ORAL at 15:46

## 2024-05-21 NOTE — PROGRESS NOTES
2024    Name:Alyse Bojorquez    MR#:[id number]     PROGRESS NOTE:  Post-Op 1 S/P    HD:1    Subjective   33 y.o. yo Female  s/p CS at 39w1d doing well. Pain well controlled. Tolerating regular diet and having flatus. Lochia normal.     Active Hospital Problems    Diagnosis     Delivery of pregnancy by  section      2024-repeat  section, baby boy named Tyrel weighing 7 pounds 6 ounces at 39 weeks and 1 day.      Pregnancy     History of  section      [id number] at 39 weeks for nonreassuring fetal wellbeing baby boy weighing 6 pounds 14 ounces named will  2024-repeat  section, baby boy named Tyrel weighing 7 pounds 6 ounces at 39 weeks and 1 day.             Objective    Vitals  Temp:  Temp:  [97.6 °F (36.4 °C)-98.5 °F (36.9 °C)] 98.2 °F (36.8 °C)  Temp src: Oral  BP:  BP: ()/(48-91) 112/68  Pulse:  Heart Rate:  [61-93] 69  RR:   Resp:  [16] 16    General Awake, alert, no distress  Abdomen Soft, non-distended, fundus firm, below umbilicus, appropriately tender  Incision  Intact, no erythema or exudate  Extremities Calves NT bilaterally     I/O last 3 completed shifts:  In: 1000 [I.V.:1000]  Out: 4319 [Urine:3975; Blood:344]    LABS:   Lab Results   Component Value Date    WBC 10.32 2024    HGB 12.7 2024    HCT 38.0 2024    MCV 86.4 2024     (L) 2024       Infant: male       Assessment   1.  POD 1  2.  Mild thrombocytopenia-will repeat tomorrow.    Plan: Doing well.  Routine postoperative care      Active Problems:   None      Kavitha Paniagua MD  2024 07:57 EDT

## 2024-05-21 NOTE — ANESTHESIA POSTPROCEDURE EVALUATION
Patient: Alyse Bojorquez    Procedure Summary       Date: 24 Room / Location: [facility] LABOR DELIVERY   [facility]OR [facility]IV[facility]    Anesthesia Start: 743 Anesthesia Stop: 839    Procedure:  SECTION REPEAT (Abdomen) Diagnosis:     Surgeons: Kavitha Paniagua MD Provider: Lola Saleh DO    Anesthesia Type: spinal, ITN ASA Status: 3            Anesthesia Type: spinal, ITN    Vitals  Vitals Value Taken Time   /68 24 0718   Temp 98.2 °F (36.8 °C) 24 0718   Pulse 69 24 0718   Resp 16 24 0718   SpO2 98 % 24 1044   Vitals shown include unfiled device data.        Post Anesthesia Care and Evaluation    Patient location during evaluation: bedside  Patient participation: complete - patient participated  Level of consciousness: awake and alert  Pain management: adequate    Airway patency: patent  Anesthetic complications: No anesthetic complications    Cardiovascular status: acceptable  Respiratory status: acceptable  Hydration status: acceptable  Post Neuraxial Block status: Motor and sensory function returned to baseline and No signs or symptoms of PDPH

## 2024-05-21 NOTE — LACTATION NOTE
24 1207   Maternal Information   Date of Referral 24   Person Making Referral lactation consultant   Maternal Reason for Referral breastfeeding currently   Infant Reason for Referral  infant   Maternal Assessment   Left Nipple Symptoms nontender   Right Nipple Symptoms tender  (tender with latch, but improves quickly)   Maternal Infant Feeding   Maternal Emotional State independent   Infant Positioning cradle   Pain with Feeding yes   Pain Location nipple, right   Pain Description soreness  (resolves quickly)   Latch Assistance none needed;verbal guidance offered   Support Person Involvement actively supporting mother   Breast Pumping   Breast Pumping Interventions other (see comments)  (encouraged to pump for short or missed feedings and with supplementation)   Lactation Referrals   Lactation Referrals outpatient lactation program   Outpatient Lactation Program Lactation Follow-up Date/Time as needed     Courtesy follow up visit. MOB has baby latched in cradle hold now. Reports baby was sleepy overnight but has been more awake and actively feeding since turning 24h this morning. Reports feedings are going well. Reports some tenderness with initial latch on right side but it improves quickly. Discussed ways to improve deeper latch to reduce this discomfort. Encouraged to call as needs arise.

## 2024-05-22 LAB
BASOPHILS # BLD AUTO: 0.03 10*3/MM3 (ref 0–0.2)
BASOPHILS NFR BLD AUTO: 0.3 % (ref 0–1.5)
DEPRECATED RDW RBC AUTO: 40.8 FL (ref 37–54)
EOSINOPHIL # BLD AUTO: 0.06 10*3/MM3 (ref 0–0.4)
EOSINOPHIL NFR BLD AUTO: 0.7 % (ref 0.3–6.2)
ERYTHROCYTE [DISTWIDTH] IN BLOOD BY AUTOMATED COUNT: 13.2 % (ref 12.3–15.4)
HCT VFR BLD AUTO: 35 % (ref 34–46.6)
HGB BLD-MCNC: 11.7 G/DL (ref 12–15.9)
IMM GRANULOCYTES # BLD AUTO: 0.04 10*3/MM3 (ref 0–0.05)
IMM GRANULOCYTES NFR BLD AUTO: 0.4 % (ref 0–0.5)
LYMPHOCYTES # BLD AUTO: 1.58 10*3/MM3 (ref 0.7–3.1)
LYMPHOCYTES NFR BLD AUTO: 17.2 % (ref 19.6–45.3)
MCH RBC QN AUTO: 28.3 PG (ref 26.6–33)
MCHC RBC AUTO-ENTMCNC: 33.4 G/DL (ref 31.5–35.7)
MCV RBC AUTO: 84.7 FL (ref 79–97)
MONOCYTES # BLD AUTO: 0.64 10*3/MM3 (ref 0.1–0.9)
MONOCYTES NFR BLD AUTO: 7 % (ref 5–12)
NEUTROPHILS NFR BLD AUTO: 6.83 10*3/MM3 (ref 1.7–7)
NEUTROPHILS NFR BLD AUTO: 74.4 % (ref 42.7–76)
NRBC BLD AUTO-RTO: 0 /100 WBC (ref 0–0.2)
PLATELET # BLD AUTO: 127 10*3/MM3 (ref 140–450)
PMV BLD AUTO: 10.1 FL (ref 6–12)
RBC # BLD AUTO: 4.13 10*6/MM3 (ref 3.77–5.28)
WBC NRBC COR # BLD AUTO: 9.18 10*3/MM3 (ref 3.4–10.8)

## 2024-05-22 PROCEDURE — 85025 COMPLETE CBC W/AUTO DIFF WBC: CPT | Performed by: OBSTETRICS & GYNECOLOGY

## 2024-05-22 RX ORDER — BISACODYL 10 MG
10 SUPPOSITORY, RECTAL RECTAL 2 TIMES DAILY PRN
Status: DISCONTINUED | OUTPATIENT
Start: 2024-05-22 | End: 2024-05-23 | Stop reason: HOSPADM

## 2024-05-22 RX ORDER — POLYETHYLENE GLYCOL 3350 17 G/17G
17 POWDER, FOR SOLUTION ORAL 2 TIMES DAILY
Status: DISCONTINUED | OUTPATIENT
Start: 2024-05-22 | End: 2024-05-23 | Stop reason: HOSPADM

## 2024-05-22 RX ADMIN — ACETAMINOPHEN 650 MG: 325 TABLET ORAL at 11:43

## 2024-05-22 RX ADMIN — ACETAMINOPHEN 650 MG: 325 TABLET ORAL at 06:17

## 2024-05-22 RX ADMIN — PRENATAL VITAMINS-IRON FUMARATE 27 MG IRON-FOLIC ACID 0.8 MG TABLET 1 TABLET: at 09:00

## 2024-05-22 RX ADMIN — ACETAMINOPHEN 650 MG: 325 TABLET ORAL at 00:14

## 2024-05-22 RX ADMIN — IBUPROFEN 600 MG: 600 TABLET, FILM COATED ORAL at 09:00

## 2024-05-22 RX ADMIN — IBUPROFEN 600 MG: 600 TABLET, FILM COATED ORAL at 16:54

## 2024-05-22 RX ADMIN — POLYETHYLENE GLYCOL 3350 17 G: 17 POWDER, FOR SOLUTION ORAL at 20:00

## 2024-05-22 RX ADMIN — IBUPROFEN 600 MG: 600 TABLET, FILM COATED ORAL at 22:27

## 2024-05-22 RX ADMIN — ACETAMINOPHEN 650 MG: 325 TABLET ORAL at 18:20

## 2024-05-22 RX ADMIN — POLYETHYLENE GLYCOL 3350 17 G: 17 POWDER, FOR SOLUTION ORAL at 11:44

## 2024-05-22 RX ADMIN — SIMETHICONE 80 MG: 80 TABLET, CHEWABLE ORAL at 09:00

## 2024-05-22 RX ADMIN — SIMETHICONE 80 MG: 80 TABLET, CHEWABLE ORAL at 20:00

## 2024-05-22 RX ADMIN — IBUPROFEN 600 MG: 600 TABLET, FILM COATED ORAL at 03:36

## 2024-05-22 RX ADMIN — DOCUSATE SODIUM 100 MG: 100 CAPSULE, LIQUID FILLED ORAL at 09:00

## 2024-05-22 NOTE — PROGRESS NOTES
Postpartum Progress Note    Patient name: Alyse Bojorquez  YOB: 1990   MRN: [id number]  Referring Provider: Kavitha Paniagua MD  Admission Date: 2024  Date of Service: 2024    ID: 33 y.o.     Diagnosis:   S/p  delivery 2 Days Post-Op     History of  section    Pregnancy    Delivery of pregnancy by  section       Subjective:      No complaints.  Moderate lochia.  Ambulating, voiding, tolerating diet.  Pain well controlled.  The patient is currently breastfeeding.   This baby is a male    Objective:      Vital signs:  Vital Signs Range for the last 24 hours  Temperature: Temp:  [97.2 °F (36.2 °C)-98.7 °F (37.1 °C)] 98.3 °F (36.8 °C)   Temp Source: Temp src: Oral   BP: BP: (117-142)/(57-83) 117/64   Pulse: Heart Rate:  [72-84] 81   Respirations: Resp:  [16-18] 18   Weight: 73 kg (161 lb)     General: Alert & oriented x4, in no apparent distress  Abdomen: soft, nontender  Uterus: firm, nontender  Incision: clean, dry, intact,  Extremities: nontender; no edema      Labs:  Lab Results   Component Value Date    WBC 9.18 2024    HGB 11.7 (L) 2024    HCT 35.0 2024    MCV 84.7 2024     (L) 2024     Results from last 7 days   Lab Units 24  0703   ABO TYPING  O   RH TYPING  Positive     External Prenatal Results       Pregnancy Outside Results - Transcribed From Office Records - See Scanned Records For Details       Test Value Date Time    ABO  O  24 0703    Rh  Positive  24 0703    Antibody Screen  Negative  24 0703       Negative  24 0933       Negative  10/16/23 09    Varicella IgG       Rubella  12.30 index 10/16/23 0959    Hgb  11.7 g/dL 24 0354       12.7 g/dL 24 0427       13.2 g/dL 24 0839       12.3 g/dL 24 0933       12.9 g/dL 10/16/23 0959    Hct  35.0 % 24 0354       38.0 % 24 0427       39.8 % 24 0839       38.0 % 24 0933        38.6 % 10/16/23 0959    Glucose Fasting GTT       Glucose Tolerance Test 1 hour       Glucose Tolerance Test 3 hour       Gonorrhea (discrete)  Negative  10/16/23 0959    Chlamydia (discrete)  Negative  10/16/23 0959    RPR  Non Reactive  24 0933       Non Reactive  10/16/23 09    VDRL       Syphilis Antibody       HBsAg  Negative  10/16/23 0959    Herpes Simplex Virus PCR       Herpes Simplex VIrus Culture       HIV  Non Reactive  10/16/23 0959    Hep C RNA Quant PCR       Hep C Antibody  Non Reactive  10/16/23 0959    AFP       Group B Strep       GBS Susceptibility to Clindamycin       GBS Susceptibility to Erythromycin       Fetal Fibronectin       Genetic Testing, Maternal Blood                 Drug Screening       Test Value Date Time    NIPT        Urine Drug Screen       Amphetamine Screen  Negative  24 0708    Barbiturate Screen  Negative  24 0708    Benzodiazepine Screen  Negative  24 0708    Methadone Screen  Negative  24 0708    Phencyclidine Screen  Negative  24 0708    Opiates Screen  Negative  24 0708    THC Screen  Negative  24 0708    Cocaine Screen       Propoxyphene Screen       Buprenorphine Screen  Negative  24 0708    Methamphetamine Screen       Oxycodone Screen  Negative  24 0708              Legend    ^: Historical                            Assessment/Plan:      2 Days Post-Op s/p Procedure(s):   SECTION REPEAT  1. S/p  delivery: Continue postoperative care.  Doing well.  2. Infant feeding: Supportive care.  The patient is currently breastfeeding. Male infant, circ done.  3. Plt 127 today. Stable.  4. Constipation: miralax bid prn. Dulcolox supp bid prn.

## 2024-05-23 VITALS
OXYGEN SATURATION: 98 % | TEMPERATURE: 98.1 F | WEIGHT: 161 LBS | DIASTOLIC BLOOD PRESSURE: 66 MMHG | HEIGHT: 69 IN | HEART RATE: 71 BPM | SYSTOLIC BLOOD PRESSURE: 119 MMHG | RESPIRATION RATE: 18 BRPM | BODY MASS INDEX: 23.85 KG/M2

## 2024-05-23 RX ORDER — BISACODYL 10 MG
10 SUPPOSITORY, RECTAL RECTAL DAILY PRN
Qty: 10 SUPPOSITORY | Refills: 0 | Status: SHIPPED | OUTPATIENT
Start: 2024-05-23

## 2024-05-23 RX ADMIN — ACETAMINOPHEN 650 MG: 325 TABLET ORAL at 06:31

## 2024-05-23 RX ADMIN — IBUPROFEN 600 MG: 600 TABLET, FILM COATED ORAL at 09:35

## 2024-05-23 RX ADMIN — ACETAMINOPHEN 650 MG: 325 TABLET ORAL at 00:45

## 2024-05-23 RX ADMIN — POLYETHYLENE GLYCOL 3350 17 G: 17 POWDER, FOR SOLUTION ORAL at 09:35

## 2024-05-23 RX ADMIN — BISACODYL 10 MG: 10 SUPPOSITORY RECTAL at 07:48

## 2024-05-23 RX ADMIN — IBUPROFEN 600 MG: 600 TABLET, FILM COATED ORAL at 04:30

## 2024-05-23 RX ADMIN — PRENATAL VITAMINS-IRON FUMARATE 27 MG IRON-FOLIC ACID 0.8 MG TABLET 1 TABLET: at 09:35

## 2024-05-23 NOTE — DISCHARGE SUMMARY
Discharge Summary    Date of Admission: 2024  Date of Discharge:  2024      Patient: Alyse Bojorquez      MR#:[id number]    Primary Surgeon/OB: Kavitha Paniagua MD    Discharge Surgeon/OB:    Presenting Problem/History of Present Illness  Pregnancy [Z34.90]  Pregnancy [Z34.90]       History of  section    Pregnancy    Delivery of pregnancy by  section        Discharge Diagnosis:  section at 39w1d    Procedures:  , Low Transverse     2024    8:11 AM      Feeding method: Breastfeeding Status: Yes    Rh Immune globulin given: not applicable    Rubella vaccine given: no    Discharge Date: 2024; Discharge Time: 09:44 EDT    Early Discharge:  NO    Hospital Course  Patient is a 33 y.o. female  at 39w1d status post  section with uneventful postoperative recovery.  Patient was advanced to regular diet on postoperative day#1.  On discharge, ambulating, tolerating a regular diet without any difficulties and her incision is dry, clean and intact.     Infant:   male  fetus 3355 g (7 lb 6.3 oz)  with Apgar scores of 9 , 9  at five minutes.    Circumcision: yes    Condition on Discharge:  Stable    Vital Signs  Temp:  [98.1 °F (36.7 °C)-98.4 °F (36.9 °C)] 98.1 °F (36.7 °C)  Heart Rate:  [66-73] 71  Resp:  [16-18] 18  BP: (119-128)/(61-73) 119/66    Lab Results   Component Value Date    WBC 9.18 2024    HGB 11.7 (L) 2024    HCT 35.0 2024    MCV 84.7 2024     (L) 2024       Discharge Disposition  Home or Self Care    Discharge Medications     Discharge Medications        New Medications        Instructions Start Date   bisacodyl 10 MG suppository  Commonly known as: DULCOLAX   10 mg, Rectal, Daily PRN             Continue These Medications        Instructions Start Date   acetaminophen 500 MG tablet  Commonly known as: TYLENOL   500 mg, Oral, Every 6 Hours PRN      famotidine 20 MG tablet  Commonly known as: PEPCID    20 mg, Oral, 2 Times Daily      Fiber Complete tablet   Oral      MAGNESIUM PO   Oral      prenatal (CLASSIC) vitamin 28-0.8 MG tablet tablet  Generic drug: prenatal vitamin   1 tablet, Oral, Daily      propranolol 10 MG tablet  Commonly known as: INDERAL   Take 1 tablet by mouth As Needed (flights).             Stop These Medications      Unisom SleepTabs 25 MG tablet  Generic drug: doxylamine              Discharge Diet:   Diet Instructions       Diet: Regular/House Diet; Regular (IDDSI 7); Thin (IDDSI 0)      Discharge Diet: Regular/House Diet    Texture: Regular (IDDSI 7)    Fluid Consistency: Thin (IDDSI 0)            Activity at Discharge:   Activity Instructions       Driving Restrictions      Type of Restriction:  Driving  Bathing  Sex  Lifting       Driving Restrictions: No Driving (Time Limited)    Length: 2 Weeks    Explain Sexual Activity Restrictions: 6 weeks    Bathing Restrictions: No Tub Bath    Lifting Restrictions: Lifting Restriction (Indicate Limit)    Weight Limit (Pounds): 20    Length of Lifting Restriction: 2 weeks    Pelvic Rest              Follow-up Appointments  Future Appointments   Date Time Provider Department Center   6/3/2024  1:30 PM Jessie Christensen APRN [facility]  [facility]     Additional Instructions for the Follow-ups that You Need to Schedule       Call MD With Problems / Concerns   As directed      Instructions: Discussed changes in postpartum mood-anxiety, depression, sadness to call office for evaluation as soon as symptoms arise even before follow up appointment. Monitor for excessive bleeding >1 pad/hr for several hours, dizziness, syncope, fever or changes in blood pressure.     Reviewed Pre-eclampsia signs/symptoms-Headache not relieved by tylenol or rest, chest pain, shortness of breath, right upper quadrant pain, blurry vision. Monitor blood pressure at home call provider if above 140/90 consistently.    Order Comments: Instructions: Discussed changes in postpartum  mood-anxiety, depression, sadness to call office for evaluation as soon as symptoms arise even before follow up appointment. Monitor for excessive bleeding >1 pad/hr for several hours, dizziness, syncope, fever or changes in blood pressure.  Reviewed Pre-eclampsia signs/symptoms-Headache not relieved by tylenol or rest, chest pain, shortness of breath, right upper quadrant pain, blurry vision. Monitor blood pressure at home call provider if above 140/90 consistently.         Discharge Follow-up with Specified Provider:  or [facility]; 2 Weeks   As directed      To:  or [facility]   Follow Up: 2 Weeks                ASHLI Son  05/23/24  09:44 EDT

## 2024-05-23 NOTE — PLAN OF CARE
Problem: Adult Inpatient Plan of Care  Goal: Plan of Care Review  Outcome: Met  Goal: Patient-Specific Goal (Individualized)  Outcome: Met  Goal: Absence of Hospital-Acquired Illness or Injury  Outcome: Met  Intervention: Identify and Manage Fall Risk  Recent Flowsheet Documentation  Taken 5/23/2024 0748 by Jessie Moore RN  Safety Promotion/Fall Prevention: safety round/check completed  Taken 5/23/2024 0714 by Jessie Moore RN  Safety Promotion/Fall Prevention: safety round/check completed  Intervention: Prevent and Manage VTE (Venous Thromboembolism) Risk  Recent Flowsheet Documentation  Taken 5/23/2024 0748 by Jessie Moore RN  Activity Management: up ad sathya  Goal: Optimal Comfort and Wellbeing  Outcome: Met  Intervention: Monitor Pain and Promote Comfort  Recent Flowsheet Documentation  Taken 5/23/2024 0748 by Jessie Moore RN  Pain Management Interventions:   around-the-clock dosing utilized   pain management plan reviewed with patient/caregiver  Goal: Readiness for Transition of Care  Outcome: Met   Goal Outcome Evaluation:

## 2024-06-03 ENCOUNTER — POSTPARTUM VISIT (OUTPATIENT)
Dept: OBSTETRICS AND GYNECOLOGY | Facility: CLINIC | Age: 34
End: 2024-06-03
Payer: COMMERCIAL

## 2024-06-03 VITALS — SYSTOLIC BLOOD PRESSURE: 108 MMHG | DIASTOLIC BLOOD PRESSURE: 66 MMHG | WEIGHT: 147 LBS | BODY MASS INDEX: 21.71 KG/M2

## 2024-06-03 DIAGNOSIS — Z98.891 HISTORY OF CESAREAN SECTION: Primary | ICD-10-CM

## 2024-06-03 PROCEDURE — 0503F POSTPARTUM CARE VISIT: CPT

## 2024-06-03 NOTE — PROGRESS NOTES
Chief Complaint   Patient presents with    Postpartum Care       Postpartum Visit         Alyse Bojorquez is a 33 y.o.  who presents today for a 3 week(s) postpartum check.      C/S: repeat     , Low Transverse    Information for the patient's :  Tyrel Bojorquez [[id number]]   2024   male   Tyrel Bojorquez   3355 g (7 lb 6.3 oz)   Gestational Age: 39w1d      Baby Discharged with Mom  Delivering MD: Kavitha Paniagua MD.    Pregnancy complications: no known issues    Patient reports her incision is clean, dry, intact. Patient describes vaginal bleeding as light. She is breastfeeding. She would like to discuss the following complaints today: she is wanting to know about tampon use and physical therapy- related to core strength.     She desires to discuss her options  for contraception.    Patient denies concerns for postpartum depression/anxiety. Patient denies  suicidal or homicidal ideation. Her postpartum depression screening questionnaire: 1. No treatment is indicated      The additional following portions of the patient's history were reviewed and updated as appropriate: allergies and current medications.      Review of Systems   All other systems reviewed and are negative.      I have reviewed and agree with the HPI, ROS, and historical information as entered above. Jessie Christensen, APRN      Objective   /66   Wt 66.7 kg (147 lb)   LMP 2023   Breastfeeding Yes   BMI 21.71 kg/m²     Physical Exam  Vitals and nursing note reviewed.   Constitutional:       General: She is not in acute distress.     Appearance: Normal appearance. She is not ill-appearing, toxic-appearing or diaphoretic.   Pulmonary:      Effort: Pulmonary effort is normal. No respiratory distress.   Abdominal:      General: A surgical scar is present.      Palpations: Abdomen is soft.          Comments: Low transverse abdominal incision OMER healing well no s/s infection   Neurological:       Mental Status: She is alert and oriented to person, place, and time.   Psychiatric:         Attention and Perception: Attention normal.         Mood and Affect: Mood normal.         Behavior: Behavior normal. Behavior is cooperative.         Thought Content: Thought content normal. Thought content does not include homicidal or suicidal ideation.         Cognition and Memory: Cognition normal.         Judgment: Judgment normal.              Assessment and Plan    Problem List Items Addressed This Visit          Gravid and     History of  section - Primary    Overview     [id number] at 39 weeks for nonreassuring fetal wellbeing baby boy weighing 6 pounds 14 ounces named will  2024-repeat  section, baby boy named Tyrel weighing 7 pounds 6 ounces at 39 weeks and 1 day.            Other Visit Diagnoses       Postpartum follow-up                S/p , 2 week(s) postpartum.  Doing well.    Continued pelvic rest with a return to driving and light physical activity.  Baby doing well.  Breastfeeding going well.  No si/sx of postpartum depression  Contraception: contraceptive methods: Condoms  Follow up in four weeks.    Jessie Christensen, [facility]  2024

## 2024-06-06 ENCOUNTER — MATERNAL SCREENING (OUTPATIENT)
Dept: CALL CENTER | Facility: HOSPITAL | Age: 34
End: 2024-06-06
Payer: COMMERCIAL

## 2024-06-06 NOTE — OUTREACH NOTE
Maternal Screening Survey      Flowsheet Row Responses   Facility patient discharged from? [place]   Attempt successful? Yes   Call start time    Call end time 141   EPD Scale: Able to Laugh 0-->as much as she always could   EPD Scale: Looked Forward 0-->as much as she ever did   EPD Scale: Blamed Self 0-->no, never   EPD Scale: Been Anxious 1-->hardly ever   EPD Scale: Felt Panicky 0-->no, not at all   EPD Scale: Things Getting on Top 0-->no, has been coping as well as ever   EPD Scale: Difficulty Sleeping 0-->no, not at all   EPD Scale: Sad or Miserable 0-->no, not at all   EPD Scale: Crying 0-->no, never   EPD Scale: Thought of Harming Self 0-->never   [place]  Depression Score 1   Did any of your parents have problems with alcohol or drug use? No   Do any of your peers have problems with alcohol or drug use? No   Does your partner have problems with alcohol or drug use? No   Before you were pregnant did you have problems with alcohol or drug use? (past) No   In the past month, did you drink beer, wine, liquor or use any other drugs? (pregnancy) No   Maternal Screening call completed Yes   Call end time 141              Sofie MEREDITH - Registered Nurse

## 2024-07-01 ENCOUNTER — POSTPARTUM VISIT (OUTPATIENT)
Dept: OBSTETRICS AND GYNECOLOGY | Facility: CLINIC | Age: 34
End: 2024-07-01
Payer: COMMERCIAL

## 2024-07-01 VITALS
DIASTOLIC BLOOD PRESSURE: 60 MMHG | BODY MASS INDEX: 21.36 KG/M2 | HEIGHT: 69 IN | SYSTOLIC BLOOD PRESSURE: 102 MMHG | WEIGHT: 144.2 LBS

## 2024-07-01 PROBLEM — B95.1 POSITIVE GBS TEST: Status: RESOLVED | Noted: 2023-10-20 | Resolved: 2024-07-01

## 2024-07-01 PROBLEM — Z34.90 PREGNANCY: Status: RESOLVED | Noted: 2024-04-22 | Resolved: 2024-07-01

## 2024-07-01 PROBLEM — Z34.90 PRENATAL CARE: Status: RESOLVED | Noted: 2023-10-16 | Resolved: 2024-07-01

## 2024-07-01 PROCEDURE — 0503F POSTPARTUM CARE VISIT: CPT | Performed by: OBSTETRICS & GYNECOLOGY

## 2024-07-01 NOTE — PROGRESS NOTES
Chief Complaint   Patient presents with    Postpartum Care       Postpartum Visit         Alyse Bojorquez is a 33 y.o.  who presents today for a 6 week(s) postpartum check.     C/S: repeat     , Low Transverse    Information for the patient's :  Tyrel Bojorquez [[id number]]   2024   male   Tyrel Bojorquez   3355 g (7 lb 6.3 oz)   Gestational Age: 39w1d        Baby Discharged: Discharged with Mom  Delivering Physician: Kavitha Paniagua MD    Her pregnancy was complicated by no known issues. The incision is healing well. Pt reports soreness. Patient describes vaginal bleeding as light.  Patient is breastfeeding.  She desires  condoms  for contraception.      She would like to discuss the following complaints today: none.    Patient denies concerns for postpartum depression/anxiety. Patient denies  suicidal or homicidal ideation. Her postpartum depression screening questionnaire: 0. No treatment is indicated      Last Pap : 2022. Results: negative. HPV: negative.   Last Completed Pap Smear            PAP SMEAR (Every 3 Years) Next due on 2022  LIQUID-BASED PAP SMEAR, [facility] ([facility])    2021  SCANNED - PAP SMEAR    2020  Done - normal in kg with [facility]                      The additional following portions of the patient's history were reviewed and updated as appropriate: allergies, current medications, past family history, past medical history, past social history, past surgical history, and problem list.    Review of Systems   Constitutional: Negative.    HENT: Negative.     Eyes: Negative.    Respiratory: Negative.     Cardiovascular: Negative.    Gastrointestinal: Negative.    Endocrine: Negative.    Genitourinary: Negative.    Musculoskeletal: Negative.    Skin:  Positive for wound.   Allergic/Immunologic: Negative.    Neurological: Negative.    Hematological: Negative.    Psychiatric/Behavioral: Negative.       All  "other systems reviewed and are negative.     I have reviewed and agree with the HPI, ROS, and historical information as entered above. Kavitha Paniagua MD      /60   Ht 175.3 cm (69\")   Wt 65.4 kg (144 lb 3.2 oz)   LMP  (LMP Unknown)   Breastfeeding Yes   BMI 21.29 kg/m²     Physical Exam  Vitals and nursing note reviewed. Exam conducted with a chaperone present.   Constitutional:       Appearance: She is well-developed.   HENT:      Head: Normocephalic and atraumatic.   Pulmonary:      Effort: Pulmonary effort is normal.   Abdominal:      General: A surgical scar is present.      Palpations: Abdomen is soft. Abdomen is not rigid.      Comments: Clean, Dry, and Intact.  No erythema.    Genitourinary:     Vagina: No lesions or prolapsed vaginal walls.      Cervix: No lesion or erythema.      Uterus: Enlarged. Not tender and no uterine prolapse.       Adnexa:         Right: No mass, tenderness or fullness.          Left: No mass, tenderness or fullness.     Musculoskeletal:      Cervical back: Normal range of motion.   Neurological:      Mental Status: She is alert and oriented to person, place, and time.   Psychiatric:         Mood and Affect: Mood normal.         Behavior: Behavior normal.             Assessment and Plan    Problem List Items Addressed This Visit          Gravid and     Delivery of pregnancy by  section - Primary    Overview     2024-repeat  section, baby boy named Tyrel weighing 7 pounds 6 ounces at 39 weeks and 1 day.            S/p , 6 week(s) postpartum.  Doing well.    Return to normal physical activity.  No pelvic restrictions.   Baby doing well.  Contraception: contraceptive methods: Condoms  Return in about 1 year (around 2025) for Annual physical.     Kavitha Paniagua MD  2024   "

## 2025-03-11 ENCOUNTER — TELEPHONE (OUTPATIENT)
Dept: OBSTETRICS AND GYNECOLOGY | Facility: CLINIC | Age: 35
End: 2025-03-11
Payer: COMMERCIAL

## 2025-03-11 ENCOUNTER — OFFICE VISIT (OUTPATIENT)
Dept: OBSTETRICS AND GYNECOLOGY | Facility: CLINIC | Age: 35
End: 2025-03-11
Payer: COMMERCIAL

## 2025-03-11 VITALS
OXYGEN SATURATION: 99 % | HEART RATE: 85 BPM | HEIGHT: 69 IN | DIASTOLIC BLOOD PRESSURE: 66 MMHG | TEMPERATURE: 98.5 F | SYSTOLIC BLOOD PRESSURE: 102 MMHG | BODY MASS INDEX: 19.26 KG/M2 | WEIGHT: 130 LBS

## 2025-03-11 DIAGNOSIS — N61.0 ACUTE MASTITIS: Primary | ICD-10-CM

## 2025-03-11 PROCEDURE — 99213 OFFICE O/P EST LOW 20 MIN: CPT | Performed by: NURSE PRACTITIONER

## 2025-03-11 RX ORDER — CEPHALEXIN 500 MG/1
500 CAPSULE ORAL 4 TIMES DAILY
Qty: 40 CAPSULE | Refills: 0 | Status: SHIPPED | OUTPATIENT
Start: 2025-03-11 | End: 2025-03-21

## 2025-03-11 RX ORDER — ADALIMUMAB-ADAZ 40 MG/.4ML
INJECTION, SOLUTION SUBCUTANEOUS
COMMUNITY
Start: 2025-03-05

## 2025-03-11 NOTE — PROGRESS NOTES
OBGYN Office Note      Chief Complaint   Patient presents with    Breast Problem        Subjective     HPI  Alyse Bojorquez is a 34 y.o. female, , who presents with breast complaints.    6 wk PP visit 24. Patient states she thinks she has mastitis in left breast. Left breast was painful when she woke and began to have body aches and chills.   Today she notes redness and tenderness in lower aspect; area feels firm but palpable lump. States massage, heat, and ice have not helped. States she is still nursing; not weaning.     She states she has experienced this problem for 1 day. The problem gets better with Tylenol and Ibuprofen. She states she can tell when it is wearing off.  The patient denies dryness, nipple discharge, pruritus, rash, and skin lesion(s). She has not had a mammogram before. She does not have a family history of breast cancer.. Other concerns include: none    Her last LMP was No LMP recorded..      Current contraception: contraceptive methods: Condoms    Last mammogram:   Last Completed Mammogram    This patient has no relevant Health Maintenance data.       Tobacco Usage?: No     Past Medical History:   Diagnosis Date    Anal fissure     Ankylosing spondylitis     Anxiety     Depression     GERD (gastroesophageal reflux disease)     Internal hemorrhoid     Kidney stone     passed them    Papanicolaou smear 2018    WNL    Pregnancy 2024       Past Surgical History:   Procedure Laterality Date     SECTION N/A 2021    Procedure:  SECTION PRIMARY;  Surgeon: Kavitha Paniagua MD;  Location: [facility] LABOR DELIVERY;  Service: Obstetrics/Gynecology;  Laterality: N/A;     SECTION N/A 2024    Procedure:  SECTION REPEAT;  Surgeon: Kavitha Paniagua MD;  Location: [facility] LABOR DELIVERY;  Service: Obstetrics/Gynecology;  Laterality: N/A;    OTHER SURGICAL HISTORY      Hemorrhoid surgery    SIGMOIDOSCOPY      WISDOM TOOTH  "EXTRACTION         Family History   Problem Relation Age of Onset    Heart disease Other     Stroke Other           Current Outpatient Medications:     acetaminophen (TYLENOL) 500 MG tablet, Take 1 tablet by mouth Every 6 (Six) Hours As Needed for Mild Pain., Disp: , Rfl:     Adalimumab-adaz 40 MG/0.4ML solution auto-injector, , Disp: , Rfl:     prenatal vitamin (prenatal, CLASSIC, vitamin) tablet, Take 1 tablet by mouth Daily., Disp: , Rfl:     propranolol (INDERAL) 10 MG tablet, Take 1 tablet by mouth As Needed (flights)., Disp: , Rfl:     cephalexin (KEFLEX) 500 MG capsule, Take 1 capsule by mouth 4 (Four) Times a Day for 10 days., Disp: 40 capsule, Rfl: 0     Review of Systems   Constitutional: Negative.    HENT: Negative.     Respiratory: Negative.     Cardiovascular: Negative.    Gastrointestinal: Negative.    Genitourinary:  Positive for breast lump and breast pain.   Musculoskeletal: Negative.    Skin: Negative.    Allergic/Immunologic: Negative.    Neurological: Negative.    Hematological: Negative.    Psychiatric/Behavioral: Negative.         I have reviewed and agree with the HPI, ROS, and historical information as entered above. Lyndon Baxter, APRN      Objective   /66 (BP Location: Right arm, Patient Position: Sitting, Cuff Size: Adult)   Pulse 85   Temp 98.5 °F (36.9 °C) (Oral)   Ht 175.3 cm (69.02\")   Wt 59 kg (130 lb)   SpO2 99%   Breastfeeding Yes   BMI 19.19 kg/m²     Physical Exam  Vitals and nursing note reviewed. Exam conducted with a chaperone present.   Constitutional:       Appearance: Normal appearance.   Pulmonary:      Effort: No retractions.   Chest:      Chest wall: No mass.   Breasts:     Right: No mass, nipple discharge, skin change or tenderness.      Left: Skin change and tenderness present. No mass or nipple discharge.          Comments: Erythema and warmth of left lower breast  Neurological:      Mental Status: She is alert.           Assessment & Plan "     Assessment     Problem List Items Addressed This Visit    None  Visit Diagnoses         Acute mastitis    -  Primary    Relevant Medications    cephalexin (KEFLEX) 500 MG capsule              Plan    Pt allergic to Pcn but has tolerated keflex in the past. Keflex Rx'd 500 mg qid x 10 days. Call back if no improvement in 48-72 hours.  Nurse or pump breast until empty. May use cold compresses, tylenol and ibuprofen for pain.   Return if symptoms worsen or fail to improve.      yLndon Baxter, APRN  03/11/2025

## 2025-03-11 NOTE — TELEPHONE ENCOUNTER
Patient of Dr. Paniagua; LOV was 6 wk PP visit 07/01/24.  Returned patient's call.   States she thinks she has mastitis in left breast.  Left breast was painful when she woke and began to have body aches and chills.   Today she notes redness and tenderness in lower aspect; area feels firm but palpable lump.   States massage, heat, and ice have not helped.   States she is still nursing; not weaning.   Appointment scheduled for this morning; patient v/u and agreed.

## 2025-03-11 NOTE — TELEPHONE ENCOUNTER
Caller: Alyse Bojorquez    Relationship: Self    Best call back number: [phone]        Who are you requesting to speak with (clinical staff, DR. SELF      What was the call regarding: PATIENT ADVISED THAT SHE MAY HAVE MASTITIS AND WOULD LIKE TO BE SEEN TODAY, PLEASE ASSIST AND ADVISE.

## 2025-07-09 ENCOUNTER — TELEPHONE (OUTPATIENT)
Dept: OBSTETRICS AND GYNECOLOGY | Facility: CLINIC | Age: 35
End: 2025-07-09

## 2025-07-31 ENCOUNTER — OFFICE VISIT (OUTPATIENT)
Dept: OBSTETRICS AND GYNECOLOGY | Facility: CLINIC | Age: 35
End: 2025-07-31
Payer: COMMERCIAL

## 2025-07-31 VITALS
HEIGHT: 69 IN | WEIGHT: 131.8 LBS | DIASTOLIC BLOOD PRESSURE: 70 MMHG | BODY MASS INDEX: 19.52 KG/M2 | SYSTOLIC BLOOD PRESSURE: 110 MMHG

## 2025-07-31 DIAGNOSIS — Z01.419 WELL WOMAN EXAM WITH ROUTINE GYNECOLOGICAL EXAM: Primary | ICD-10-CM

## 2025-07-31 NOTE — PROGRESS NOTES
Gynecologic Annual Exam Note        Gynecologic Exam        Subjective     HPI  Alyse Bojorquez is a 35 y.o.  female who presents for annual well woman exam as a established patient. Since her last visit she has started on Adalimumab-adaz 40 MG/0.4ML solution auto-injector . Patient's last menstrual period was 2025. Her periods occur every 25-35 days, lasting 6 days.  The flow is heavy (changing pad/tampon every 2-3 hrs) to light. She denies dysmenorrhea. Marital Status: .  She is sexually active. She has not had new partners.. STD testing recommendations have been explained to the patient and she does not desire STD testing.    The patient would like to discuss the following complaints today: none    Additional OB/GYN History   contraceptive methods: Vasectomy   Desires to: do not start contraception  Thromboembolic Disease: none  History of migraines: yes without aura    History of STD: no    Last Pap : 2022. Results: negative. HPV: negative.   Last Completed Pap Smear    This patient has no relevant Health Maintenance data.          History of abnormal Pap smear: no  Gardasil status:unsure if she received the vaccine  Family history of uterine, colon, breast, or ovarian cancer: no  Performs monthly Self-Breast Exam: no  Exercises Regularly:yes  Feelings of Anxiety or Depression: no  Tobacco Usage?: No       Current Outpatient Medications:     Adalimumab-adaz 40 MG/0.4ML solution auto-injector, , Disp: , Rfl:     propranolol (INDERAL) 10 MG tablet, Take 1 tablet by mouth As Needed (flights)., Disp: , Rfl:      Patient denies the need for medication refills today.    OB History          2    Para   2    Term   2       0    AB   0    Living   2         SAB   0    IAB   0    Ectopic   0    Molar   0    Multiple   0    Live Births   2                Health Maintenance   Topic Date Due    ANNUAL PHYSICAL  Never done    Annual Gynecologic Pelvic and Breast Exam   "2023    COVID-19 Vaccine ( season) 2024    PAP SMEAR  2025    INFLUENZA VACCINE  10/01/2025    TDAP/TD VACCINES (3 - Td or Tdap) 2034    HEPATITIS C SCREENING  Completed    Pneumococcal Vaccine 0-49  Aged Out       Past Medical History:   Diagnosis Date    Anal fissure     Ankylosing spondylitis     Anxiety     Depression     GERD (gastroesophageal reflux disease)     Internal hemorrhoid     Kidney stone     passed them    Papanicolaou smear 2018    WNL    Pregnancy 2024        Past Surgical History:   Procedure Laterality Date     SECTION N/A 2021    Procedure:  SECTION PRIMARY;  Surgeon: Kavitha Paniagua MD;  Location: [facility] LABOR DELIVERY;  Service: Obstetrics/Gynecology;  Laterality: N/A;     SECTION N/A 2024    Procedure:  SECTION REPEAT;  Surgeon: Kavitha Paniagua MD;  Location: [facility] LABOR DELIVERY;  Service: Obstetrics/Gynecology;  Laterality: N/A;    OTHER SURGICAL HISTORY      Hemorrhoid surgery    SIGMOIDOSCOPY      WISDOM TOOTH EXTRACTION         The additional following portions of the patient's history were reviewed and updated as appropriate: allergies, current medications, past family history, past medical history, past social history, past surgical history, and problem list.    Review of Systems   Constitutional: Negative.    HENT: Negative.     Eyes: Negative.    Respiratory: Negative.     Cardiovascular: Negative.    Gastrointestinal: Negative.    Endocrine: Negative.    Genitourinary: Negative.    Musculoskeletal: Negative.    Skin: Negative.    Allergic/Immunologic: Negative.    Neurological: Negative.    Hematological: Negative.    Psychiatric/Behavioral: Negative.           I have reviewed and agree with the HPI, ROS, and historical information as entered above. Kavitha Paniagua MD          Objective   /70   Ht 175.3 cm (69\")   Wt 59.8 kg (131 lb 12.8 oz)   LMP 2025   " Breastfeeding No   BMI 19.46 kg/m²     Physical Exam  Vitals and nursing note reviewed. Exam conducted with a chaperone present.   Constitutional:       Appearance: She is well-developed.   HENT:      Head: Normocephalic and atraumatic.   Neck:      Thyroid: No thyroid mass or thyromegaly.   Cardiovascular:      Rate and Rhythm: Normal rate and regular rhythm.      Heart sounds: No murmur heard.  Pulmonary:      Effort: Pulmonary effort is normal. No retractions.      Breath sounds: Normal breath sounds. No wheezing, rhonchi or rales.   Chest:      Chest wall: No mass or tenderness.   Breasts:     Right: Normal. No mass, nipple discharge, skin change or tenderness.      Left: Normal. No mass, nipple discharge, skin change or tenderness.   Abdominal:      General: Bowel sounds are normal.      Palpations: Abdomen is soft. Abdomen is not rigid. There is no mass.      Tenderness: There is no abdominal tenderness. There is no guarding.      Hernia: No hernia is present. There is no hernia in the left inguinal area or right inguinal area.   Genitourinary:     General: Normal vulva.      Exam position: Lithotomy position.      Pubic Area: No rash.       Labia:         Right: No rash, tenderness or lesion.         Left: No rash, tenderness or lesion.       Urethra: No urethral pain or urethral swelling.      Vagina: Normal. No vaginal discharge or lesions.      Cervix: No cervical motion tenderness, discharge, lesion or cervical bleeding.      Uterus: Normal. Not enlarged, not fixed and not tender.       Adnexa:         Right: No mass, tenderness or fullness.          Left: No mass, tenderness or fullness.        Rectum: No external hemorrhoid.   Musculoskeletal:      Cervical back: Normal range of motion. No muscular tenderness.   Neurological:      Mental Status: She is alert and oriented to person, place, and time.   Psychiatric:         Behavior: Behavior normal.            Assessment and Plan    Problem List Items  Addressed This Visit    None  Visit Diagnoses         Well woman exam with routine gynecological exam    -  Primary    Relevant Orders    LIQUID-BASED PAP SMEAR WITH HPV GENOTYPING REGARDLESS OF INTERPRETATION ([facility])            GYN annual well woman exam.   Reviewed pap guidelines.  Discussed with patient need for yearly Pap smears as she is on an immunosuppressive drug  Recommended use of Vitamin D replacement and getting adequate calcium in her diet. (1500mg)  Reviewed monthly self breast exams.  Instructed to call with lumps, pain, or breast discharge.    Reviewed exercise as a preventative health measures.   Reccommended Flu Vaccine in Fall of each year.  RTC in 1 year or PRN with problems  Return in about 1 year (around 7/31/2026) for Annual physical.    Kavitha Paniagua MD  07/31/2025

## 2025-08-01 LAB — REF LAB TEST METHOD: NORMAL

## (undated) DEVICE — PATIENT RETURN ELECTRODE, SINGLE-USE, CONTACT QUALITY MONITORING, ADULT, WITH 9FT CORD, FOR PATIENTS WEIGING OVER 33LBS. (15KG): Brand: MEGADYNE

## (undated) DEVICE — ADHS SKIN PREMIERPRO EXOFIN TOPICAL HI/VISC .5ML

## (undated) DEVICE — SUT PDS 1 TP1 48IN Z880G BX/12

## (undated) DEVICE — SOL IRR H2O BTL 1000ML STRL

## (undated) DEVICE — TRAXI PANNICULUS RETRACTOR WITH RETENTUS TECHNOLOGY (BMI 30-50): Brand: TRAXI® PANNICULUS RETRACTOR

## (undated) DEVICE — PK C/SECT 10

## (undated) DEVICE — SUT VIC 4/0 KS 27IN VCP662H

## (undated) DEVICE — SUT GUT CHRM 2/0 CT1 27IN 811H

## (undated) DEVICE — COATED VICRYL  (POLYGLACTIN 910) SUTURE, VIOLET BRAIDED, STERILE, SYNTHETIC ABSORBABLE SUTURE: Brand: COATED VICRYL

## (undated) DEVICE — TRY SPINE BLCK WHITACRE 25G 3X5IN

## (undated) DEVICE — TRAP FLD MINIVAC MEGADYNE 100ML

## (undated) DEVICE — MAT PREVALON MOBL TRANSFR AIR WO/PAD 39X80IN

## (undated) DEVICE — GLV SURG BIOGEL LTX PF 6 1/2

## (undated) DEVICE — CLTH CLENS READYCLEANSE PERI CARE PK/5

## (undated) DEVICE — PENCL SMOKE/EVAC MEGADYNE TELESCP 10FT

## (undated) DEVICE — SKIN AFFIX SURG ADHESIVE 72/CS 0.55ML: Brand: MEDLINE

## (undated) DEVICE — 4-PORT MANIFOLD: Brand: NEPTUNE 2

## (undated) DEVICE — SUT GUT CHRM 1 CTX 36IN 905H

## (undated) DEVICE — SOL IRR NACL 0.9PCT BT 1000ML

## (undated) DEVICE — APPL CHLORAPREP TINTED 26ML TEAL

## (undated) DEVICE — STPLR SKIN SUBCUTICULAR INSORB 2030